# Patient Record
Sex: FEMALE | Race: WHITE | Employment: UNEMPLOYED | URBAN - METROPOLITAN AREA
[De-identification: names, ages, dates, MRNs, and addresses within clinical notes are randomized per-mention and may not be internally consistent; named-entity substitution may affect disease eponyms.]

---

## 2018-01-29 ENCOUNTER — APPOINTMENT (OUTPATIENT)
Dept: GENERAL RADIOLOGY | Age: 38
DRG: 181 | End: 2018-01-29
Payer: COMMERCIAL

## 2018-01-29 ENCOUNTER — HOSPITAL ENCOUNTER (INPATIENT)
Age: 38
LOS: 11 days | Discharge: HOME HEALTH | DRG: 181 | End: 2018-02-09
Attending: EMERGENCY MEDICINE | Admitting: PODIATRIST
Payer: COMMERCIAL

## 2018-01-29 DIAGNOSIS — Z95.828 STATUS POST BYPASS GRAFT OF EXTREMITY: ICD-10-CM

## 2018-01-29 DIAGNOSIS — G89.18 POST-OP PAIN: ICD-10-CM

## 2018-01-29 DIAGNOSIS — L03.116 CELLULITIS OF LEFT FOOT: ICD-10-CM

## 2018-01-29 DIAGNOSIS — L03.116 CELLULITIS OF LEFT LOWER EXTREMITY: ICD-10-CM

## 2018-01-29 DIAGNOSIS — I96 GANGRENE OF TOE OF LEFT FOOT (HCC): ICD-10-CM

## 2018-01-29 DIAGNOSIS — M86.9 OSTEOMYELITIS OF LEFT FOOT, UNSPECIFIED TYPE (HCC): Primary | ICD-10-CM

## 2018-01-29 PROBLEM — L97.529 CHRONIC ULCER OF GREAT TOE OF LEFT FOOT (HCC): Status: ACTIVE | Noted: 2018-01-29

## 2018-01-29 LAB
ABSOLUTE EOS #: 0.49 K/UL (ref 0–0.44)
ABSOLUTE IMMATURE GRANULOCYTE: 0.03 K/UL (ref 0–0.3)
ABSOLUTE LYMPH #: 2.87 K/UL (ref 1.1–3.7)
ABSOLUTE MONO #: 0.65 K/UL (ref 0.1–1.2)
ANION GAP SERPL CALCULATED.3IONS-SCNC: 12 MMOL/L (ref 9–17)
BASOPHILS # BLD: 0 % (ref 0–2)
BASOPHILS ABSOLUTE: 0.03 K/UL (ref 0–0.2)
BUN BLDV-MCNC: 12 MG/DL (ref 6–20)
BUN/CREAT BLD: ABNORMAL (ref 9–20)
C-REACTIVE PROTEIN: 40.9 MG/L (ref 0–5)
CALCIUM SERPL-MCNC: 9.1 MG/DL (ref 8.6–10.4)
CHLORIDE BLD-SCNC: 102 MMOL/L (ref 98–107)
CO2: 30 MMOL/L (ref 20–31)
CREAT SERPL-MCNC: 0.46 MG/DL (ref 0.5–0.9)
DIFFERENTIAL TYPE: ABNORMAL
EOSINOPHILS RELATIVE PERCENT: 5 % (ref 1–4)
GFR AFRICAN AMERICAN: >60 ML/MIN
GFR NON-AFRICAN AMERICAN: >60 ML/MIN
GFR SERPL CREATININE-BSD FRML MDRD: ABNORMAL ML/MIN/{1.73_M2}
GFR SERPL CREATININE-BSD FRML MDRD: ABNORMAL ML/MIN/{1.73_M2}
GLUCOSE BLD-MCNC: 110 MG/DL (ref 70–99)
HCT VFR BLD CALC: 40.5 % (ref 36.3–47.1)
HEMOGLOBIN: 13.3 G/DL (ref 11.9–15.1)
IMMATURE GRANULOCYTES: 0 %
LYMPHOCYTES # BLD: 29 % (ref 24–43)
MCH RBC QN AUTO: 31.7 PG (ref 25.2–33.5)
MCHC RBC AUTO-ENTMCNC: 32.8 G/DL (ref 28.4–34.8)
MCV RBC AUTO: 96.7 FL (ref 82.6–102.9)
MONOCYTES # BLD: 7 % (ref 3–12)
NRBC AUTOMATED: 0 PER 100 WBC
PDW BLD-RTO: 13.5 % (ref 11.8–14.4)
PLATELET # BLD: 329 K/UL (ref 138–453)
PLATELET ESTIMATE: ABNORMAL
PMV BLD AUTO: 9.1 FL (ref 8.1–13.5)
POTASSIUM SERPL-SCNC: 3.8 MMOL/L (ref 3.7–5.3)
RBC # BLD: 4.19 M/UL (ref 3.95–5.11)
RBC # BLD: ABNORMAL 10*6/UL
SEDIMENTATION RATE, ERYTHROCYTE: 21 MM (ref 0–20)
SEG NEUTROPHILS: 59 % (ref 36–65)
SEGMENTED NEUTROPHILS ABSOLUTE COUNT: 5.82 K/UL (ref 1.5–8.1)
SODIUM BLD-SCNC: 144 MMOL/L (ref 135–144)
WBC # BLD: 9.9 K/UL (ref 3.5–11.3)
WBC # BLD: ABNORMAL 10*3/UL

## 2018-01-29 PROCEDURE — 86140 C-REACTIVE PROTEIN: CPT

## 2018-01-29 PROCEDURE — 85025 COMPLETE CBC W/AUTO DIFF WBC: CPT

## 2018-01-29 PROCEDURE — 96375 TX/PRO/DX INJ NEW DRUG ADDON: CPT

## 2018-01-29 PROCEDURE — 93970 EXTREMITY STUDY: CPT

## 2018-01-29 PROCEDURE — 6360000002 HC RX W HCPCS: Performed by: EMERGENCY MEDICINE

## 2018-01-29 PROCEDURE — 96365 THER/PROPH/DIAG IV INF INIT: CPT

## 2018-01-29 PROCEDURE — 85651 RBC SED RATE NONAUTOMATED: CPT

## 2018-01-29 PROCEDURE — 80048 BASIC METABOLIC PNL TOTAL CA: CPT

## 2018-01-29 PROCEDURE — 6360000002 HC RX W HCPCS: Performed by: STUDENT IN AN ORGANIZED HEALTH CARE EDUCATION/TRAINING PROGRAM

## 2018-01-29 PROCEDURE — G0384 LEV 5 HOSP TYPE B ED VISIT: HCPCS

## 2018-01-29 PROCEDURE — 1200000000 HC SEMI PRIVATE

## 2018-01-29 PROCEDURE — 6370000000 HC RX 637 (ALT 250 FOR IP): Performed by: STUDENT IN AN ORGANIZED HEALTH CARE EDUCATION/TRAINING PROGRAM

## 2018-01-29 PROCEDURE — 73630 X-RAY EXAM OF FOOT: CPT

## 2018-01-29 PROCEDURE — 84703 CHORIONIC GONADOTROPIN ASSAY: CPT

## 2018-01-29 PROCEDURE — 6370000000 HC RX 637 (ALT 250 FOR IP): Performed by: EMERGENCY MEDICINE

## 2018-01-29 PROCEDURE — 2580000003 HC RX 258: Performed by: STUDENT IN AN ORGANIZED HEALTH CARE EDUCATION/TRAINING PROGRAM

## 2018-01-29 PROCEDURE — 93971 EXTREMITY STUDY: CPT

## 2018-01-29 RX ORDER — OXYCODONE HYDROCHLORIDE 5 MG/1
5 TABLET ORAL ONCE
Status: COMPLETED | OUTPATIENT
Start: 2018-01-29 | End: 2018-01-29

## 2018-01-29 RX ORDER — LEVOFLOXACIN 5 MG/ML
500 INJECTION, SOLUTION INTRAVENOUS EVERY 24 HOURS
Status: DISCONTINUED | OUTPATIENT
Start: 2018-01-29 | End: 2018-01-30

## 2018-01-29 RX ORDER — VANCOMYCIN HYDROCHLORIDE 1 G/200ML
1000 INJECTION, SOLUTION INTRAVENOUS ONCE
Status: COMPLETED | OUTPATIENT
Start: 2018-01-29 | End: 2018-01-29

## 2018-01-29 RX ORDER — ONDANSETRON 2 MG/ML
4 INJECTION INTRAMUSCULAR; INTRAVENOUS EVERY 6 HOURS PRN
Status: DISCONTINUED | OUTPATIENT
Start: 2018-01-29 | End: 2018-02-09 | Stop reason: HOSPADM

## 2018-01-29 RX ORDER — GABAPENTIN 300 MG/1
300 CAPSULE ORAL 3 TIMES DAILY
Status: DISCONTINUED | OUTPATIENT
Start: 2018-01-29 | End: 2018-02-04

## 2018-01-29 RX ORDER — SODIUM CHLORIDE 0.9 % (FLUSH) 0.9 %
10 SYRINGE (ML) INJECTION PRN
Status: DISCONTINUED | OUTPATIENT
Start: 2018-01-29 | End: 2018-02-09 | Stop reason: HOSPADM

## 2018-01-29 RX ORDER — KETOROLAC TROMETHAMINE 30 MG/ML
30 INJECTION, SOLUTION INTRAMUSCULAR; INTRAVENOUS ONCE
Status: COMPLETED | OUTPATIENT
Start: 2018-01-29 | End: 2018-01-29

## 2018-01-29 RX ORDER — SODIUM CHLORIDE 0.9 % (FLUSH) 0.9 %
10 SYRINGE (ML) INJECTION EVERY 12 HOURS SCHEDULED
Status: DISCONTINUED | OUTPATIENT
Start: 2018-01-29 | End: 2018-02-09 | Stop reason: HOSPADM

## 2018-01-29 RX ORDER — CLINDAMYCIN HYDROCHLORIDE 300 MG/1
300 CAPSULE ORAL 3 TIMES DAILY
Status: ON HOLD | COMMUNITY
End: 2018-02-04 | Stop reason: HOSPADM

## 2018-01-29 RX ORDER — CLOBETASOL PROPIONATE 0.5 MG/G
OINTMENT TOPICAL 2 TIMES DAILY
Status: DISCONTINUED | OUTPATIENT
Start: 2018-01-29 | End: 2018-02-04

## 2018-01-29 RX ORDER — MORPHINE SULFATE 2 MG/ML
2 INJECTION, SOLUTION INTRAMUSCULAR; INTRAVENOUS
Status: DISCONTINUED | OUTPATIENT
Start: 2018-01-29 | End: 2018-01-31

## 2018-01-29 RX ORDER — OXYCODONE HYDROCHLORIDE AND ACETAMINOPHEN 5; 325 MG/1; MG/1
1 TABLET ORAL EVERY 4 HOURS PRN
Status: DISCONTINUED | OUTPATIENT
Start: 2018-01-29 | End: 2018-02-02

## 2018-01-29 RX ORDER — MORPHINE SULFATE 2 MG/ML
4 INJECTION, SOLUTION INTRAMUSCULAR; INTRAVENOUS
Status: DISCONTINUED | OUTPATIENT
Start: 2018-01-29 | End: 2018-01-31

## 2018-01-29 RX ORDER — ACETAMINOPHEN 325 MG/1
650 TABLET ORAL EVERY 4 HOURS PRN
Status: DISCONTINUED | OUTPATIENT
Start: 2018-01-29 | End: 2018-01-30

## 2018-01-29 RX ORDER — CIPROFLOXACIN 500 MG/1
500 TABLET, FILM COATED ORAL 2 TIMES DAILY
Status: ON HOLD | COMMUNITY
End: 2018-02-04 | Stop reason: HOSPADM

## 2018-01-29 RX ORDER — IBUPROFEN 800 MG/1
800 TABLET ORAL EVERY 6 HOURS PRN
COMMUNITY
End: 2018-02-26 | Stop reason: ALTCHOICE

## 2018-01-29 RX ORDER — GABAPENTIN 300 MG/1
600 CAPSULE ORAL 3 TIMES DAILY
COMMUNITY

## 2018-01-29 RX ORDER — SERTRALINE HYDROCHLORIDE 25 MG/1
25 TABLET, FILM COATED ORAL NIGHTLY
Status: DISCONTINUED | OUTPATIENT
Start: 2018-01-29 | End: 2018-02-09

## 2018-01-29 RX ADMIN — OXYCODONE HYDROCHLORIDE AND ACETAMINOPHEN 1 TABLET: 5; 325 TABLET ORAL at 23:19

## 2018-01-29 RX ADMIN — HYDROMORPHONE HYDROCHLORIDE 1 MG: 1 INJECTION, SOLUTION INTRAMUSCULAR; INTRAVENOUS; SUBCUTANEOUS at 18:01

## 2018-01-29 RX ADMIN — KETOROLAC TROMETHAMINE 30 MG: 30 INJECTION, SOLUTION INTRAMUSCULAR at 14:02

## 2018-01-29 RX ADMIN — ENOXAPARIN SODIUM 40 MG: 40 INJECTION SUBCUTANEOUS at 22:11

## 2018-01-29 RX ADMIN — GABAPENTIN 300 MG: 300 CAPSULE ORAL at 22:11

## 2018-01-29 RX ADMIN — LEVOFLOXACIN 500 MG: 5 INJECTION, SOLUTION INTRAVENOUS at 22:11

## 2018-01-29 RX ADMIN — OXYCODONE HYDROCHLORIDE 5 MG: 5 TABLET ORAL at 17:29

## 2018-01-29 RX ADMIN — SERTRALINE 25 MG: 25 TABLET, FILM COATED ORAL at 22:11

## 2018-01-29 RX ADMIN — CLOBETASOL PROPIONATE: 0.5 OINTMENT TOPICAL at 22:08

## 2018-01-29 RX ADMIN — Medication 10 ML: at 22:04

## 2018-01-29 RX ADMIN — MORPHINE SULFATE 2 MG: 2 INJECTION, SOLUTION INTRAMUSCULAR; INTRAVENOUS at 22:04

## 2018-01-29 RX ADMIN — VANCOMYCIN HYDROCHLORIDE 1000 MG: 1 INJECTION, SOLUTION INTRAVENOUS at 15:30

## 2018-01-29 ASSESSMENT — PAIN SCALES - GENERAL
PAINLEVEL_OUTOF10: 5
PAINLEVEL_OUTOF10: 10
PAINLEVEL_OUTOF10: 8
PAINLEVEL_OUTOF10: 10
PAINLEVEL_OUTOF10: 8

## 2018-01-29 ASSESSMENT — ENCOUNTER SYMPTOMS
BACK PAIN: 0
VOMITING: 0
NAUSEA: 0
COUGH: 0

## 2018-01-29 ASSESSMENT — PAIN DESCRIPTION - FREQUENCY
FREQUENCY: CONTINUOUS
FREQUENCY: CONTINUOUS

## 2018-01-29 ASSESSMENT — PAIN DESCRIPTION - DESCRIPTORS
DESCRIPTORS: ACHING;BURNING;TENDER;THROBBING
DESCRIPTORS: CONSTANT

## 2018-01-29 ASSESSMENT — PAIN DESCRIPTION - ORIENTATION: ORIENTATION: LEFT

## 2018-01-29 ASSESSMENT — PAIN DESCRIPTION - LOCATION
LOCATION: FOOT
LOCATION: FOOT

## 2018-01-29 ASSESSMENT — PAIN DESCRIPTION - PAIN TYPE
TYPE: ACUTE PAIN
TYPE: CHRONIC PAIN;ACUTE PAIN

## 2018-01-29 NOTE — ED PROVIDER NOTES
[address]  ED  Emergency Department Encounter  Emergency Medicine Resident     Pt Name: Ever Laboy  MRN: [id number]  Rcgfjulia 1980  Date of evaluation: 18  PCP:  Daniel Bullard, [facility]       Chief Complaint   Patient presents with    Foot Pain     pt states that she was sent here by dr Jon Lamas for pain control. she said that her insurance told her to go to pain management and she said dr Jon Lamas said he can't give her anymore pain meds and told her to come to the er.  pt is tearful and said she is tired of being in pain. pt just ended course of roxycodone and norco.pt currently taking neurontin and 2 antibiotics. HISTORY OF PRESENT ILLNESS  (Location/Symptom, Timing/Onset, Context/Setting, Quality, Duration, Modifying Factors, Severity.)      Ever Laboy is a 30 y.o. female who presents with  Chief complaint of left foot pain, discoloration. States she had a small piece of wood that was removed from underneath the toenail on the great toe on the left for approximately one year ago with a local podiatrist. Says she's had trouble with this foot healing. She says she went to his office today and was asked to come to the emergency department. She was seen at an outside emergency department 4 days ago where she was started on antibiotics. She tells me she has \"blood clots\" in this foot, but does not take any anticoagulant, antiplatelet. No fevers sweats chills nausea vomiting. No chest pain or shortness of breath no back pain or abdominal discomfort. PAST MEDICAL / SURGICAL / SOCIAL / FAMILY HISTORY      has a past medical history of Chronic back pain; Psoriasis; and Scoliosis. has a past surgical history that includes  section. Social History     Social History    Marital status: Single     Spouse name: N/A    Number of children: N/A    Years of education: N/A     Occupational History    Not on file.      Social History Main Topics    Smoking status: Current Every Day Smoker     Packs/day: 1.00     Years: 15.00    Smokeless tobacco: Never Used    Alcohol use No    Drug use: Yes     Types: Marijuana    Sexual activity: Yes     Partners: Male     Other Topics Concern    Not on file     Social History Narrative    No narrative on file       Family History   Problem Relation Age of Onset    Cancer Mother     Diabetes Maternal Grandmother     Stroke Maternal Grandmother        Allergies:  Review of patient's allergies indicates no known allergies. Home Medications:  Prior to Admission medications    Medication Sig Start Date End Date Taking? Authorizing Provider   gabapentin (NEURONTIN) 300 MG capsule Take 300 mg by mouth 3 times daily. Yes Historical Provider, MD   ibuprofen (ADVIL;MOTRIN) 800 MG tablet Take 800 mg by mouth every 6 hours as needed for Pain   Yes Historical Provider, MD   clindamycin (CLEOCIN) 300 MG capsule Take 300 mg by mouth 3 times daily   Yes Historical Provider, MD   ciprofloxacin (CIPRO) 500 MG tablet Take 500 mg by mouth 2 times daily   Yes Historical Provider, MD   clobetasol (TEMOVATE) 0.05 % ointment Apply topically 2 times daily. 3/6/14   Nikita Heredia MD   sertraline (ZOLOFT) 25 MG tablet Take 1 tablet by mouth daily. 3/6/14   Nikita Heredia MD       REVIEW OF SYSTEMS    (2-9 systems for level 4, 10 or more for level 5)      Review of Systems   Constitutional: Negative for chills and fever. HENT: Negative for congestion. Respiratory: Negative for cough. Cardiovascular: Negative for chest pain. Gastrointestinal: Negative for nausea and vomiting. Musculoskeletal: Positive for gait problem and myalgias. Negative for back pain. Skin: Positive for wound. Allergic/Immunologic: Negative for immunocompromised state. Neurological: Negative for weakness. Hematological: Does not bruise/bleed easily.        PHYSICAL EXAM   (up to 7 for level 4, 8 or more for level 5)      INITIAL VITALS:   /74   Pulse 84   Temp 98.1 °F (36.7 °C) (Oral)   Resp 14   SpO2 98%     Physical Exam   Constitutional: She is oriented to person, place, and time. She appears well-developed and well-nourished. HENT:   Head: Normocephalic and atraumatic. Nose: Nose normal.   Eyes: Conjunctivae and EOM are normal. Right eye exhibits no discharge. Left eye exhibits no discharge. No scleral icterus. Neck: Normal range of motion. No JVD present. No tracheal deviation present. Cardiovascular: Normal rate, regular rhythm and normal heart sounds. Pulmonary/Chest: Effort normal and breath sounds normal. No respiratory distress. She has no wheezes. She has no rales. Abdominal: Soft. Bowel sounds are normal. She exhibits no distension. There is no tenderness. There is no rebound. Musculoskeletal: She exhibits tenderness. She exhibits no edema or deformity. Neurological: She is alert and oriented to person, place, and time. She exhibits normal muscle tone. Coordination normal.   Skin: Skin is warm and dry. She is not diaphoretic. There is erythema. Psychiatric: She has a normal mood and affect.  Her behavior is normal.       DIFFERENTIAL  DIAGNOSIS     PLAN (LABS / IMAGING / EKG):  Orders Placed This Encounter   Procedures    XR FOOT LEFT (MIN 3 VIEWS)    VL DUP LOWER EXTREMITY VENOUS LEFT    CBC WITH AUTO DIFFERENTIAL    Basic Metabolic Panel    C-REACTIVE PROTEIN    Sedimentation Rate    Inpatient consult to Podiatry    Insert peripheral IV       MEDICATIONS ORDERED:  Orders Placed This Encounter   Medications    ketorolac (TORADOL) injection 30 mg    vancomycin (VANCOCIN) 1000 mg in dextrose 5% 200 mL IVPB         DIAGNOSTIC RESULTS / EMERGENCY DEPARTMENT COURSE / MDM     LABS:  Results for orders placed or performed during the hospital encounter of 01/29/18   CBC WITH AUTO DIFFERENTIAL   Result Value Ref Range    WBC 9.9 3.5 - 11.3 k/uL    RBC 4.19 3.95 - 5.11 m/uL    Hemoglobin 13.3 11.9 -

## 2018-01-29 NOTE — H&P
hallux  -psoriatic arthritis   -PVD  -neuropathy    Patient Active Problem List   Diagnosis    Gangrene of toe of left foot ([facility] [address]       PLAN:     Patient examined and evaluated. Diagnosis and treatment options discussed in detail.   Reviewed XR  Admit patient   Resume home medications  Betadine to left hallux, DSD   PVR/ PPG  Vascular consult- gangrenous toe  ID consult- antibiotic recommendations   Diet- General  Activity- WBAT with surgical shoe to left  DVT prophylaxis- Lovenox   PT/OT- eval  - discharge planning   Discussed with Dr. Clover Tello        Electronically signed by Lopez Shirley DPM  on 1/29/2018 at 4:40 PM

## 2018-01-29 NOTE — ED NOTES
Pt returned from scan. Pt resting on stretcher. C/o pain. Dr Tabby Mcguire at bedside. Updated pt that we are starting an antibiotic.       Radha Lynn RN  01/29/18 [id number]

## 2018-01-30 LAB
ABSOLUTE EOS #: 0.58 K/UL (ref 0–0.4)
ABSOLUTE IMMATURE GRANULOCYTE: 0 K/UL (ref 0–0.3)
ABSOLUTE LYMPH #: 3.94 K/UL (ref 1–4.8)
ABSOLUTE MONO #: 0.29 K/UL (ref 0.1–0.8)
ANION GAP SERPL CALCULATED.3IONS-SCNC: 12 MMOL/L (ref 9–17)
BASOPHILS # BLD: 1 % (ref 0–2)
BASOPHILS ABSOLUTE: 0.1 K/UL (ref 0–0.2)
BUN BLDV-MCNC: 12 MG/DL (ref 6–20)
BUN/CREAT BLD: ABNORMAL (ref 9–20)
CALCIUM SERPL-MCNC: 8.8 MG/DL (ref 8.6–10.4)
CHLORIDE BLD-SCNC: 101 MMOL/L (ref 98–107)
CO2: 25 MMOL/L (ref 20–31)
CREAT SERPL-MCNC: 0.41 MG/DL (ref 0.5–0.9)
DIFFERENTIAL TYPE: ABNORMAL
EOSINOPHILS RELATIVE PERCENT: 6 % (ref 1–4)
GFR AFRICAN AMERICAN: >60 ML/MIN
GFR NON-AFRICAN AMERICAN: >60 ML/MIN
GFR SERPL CREATININE-BSD FRML MDRD: ABNORMAL ML/MIN/{1.73_M2}
GFR SERPL CREATININE-BSD FRML MDRD: ABNORMAL ML/MIN/{1.73_M2}
GLUCOSE BLD-MCNC: 93 MG/DL (ref 70–99)
HCT VFR BLD CALC: 40.2 % (ref 36.3–47.1)
HEMOGLOBIN: 12.9 G/DL (ref 11.9–15.1)
IMMATURE GRANULOCYTES: 0 %
LYMPHOCYTES # BLD: 41 % (ref 24–44)
MCH RBC QN AUTO: 31.4 PG (ref 25.2–33.5)
MCHC RBC AUTO-ENTMCNC: 32.1 G/DL (ref 28.4–34.8)
MCV RBC AUTO: 97.8 FL (ref 82.6–102.9)
MONOCYTES # BLD: 3 % (ref 1–7)
MORPHOLOGY: NORMAL
NRBC AUTOMATED: 0 PER 100 WBC
PDW BLD-RTO: 13.5 % (ref 11.8–14.4)
PLATELET # BLD: 368 K/UL (ref 138–453)
PLATELET ESTIMATE: ABNORMAL
PMV BLD AUTO: 9.7 FL (ref 8.1–13.5)
POTASSIUM SERPL-SCNC: 3.9 MMOL/L (ref 3.7–5.3)
RBC # BLD: 4.11 M/UL (ref 3.95–5.11)
RBC # BLD: ABNORMAL 10*6/UL
SEG NEUTROPHILS: 49 % (ref 36–66)
SEGMENTED NEUTROPHILS ABSOLUTE COUNT: 4.69 K/UL (ref 1.8–7.7)
SODIUM BLD-SCNC: 138 MMOL/L (ref 135–144)
WBC # BLD: 9.6 K/UL (ref 3.5–11.3)
WBC # BLD: ABNORMAL 10*3/UL

## 2018-01-30 PROCEDURE — G8978 MOBILITY CURRENT STATUS: HCPCS

## 2018-01-30 PROCEDURE — G8979 MOBILITY GOAL STATUS: HCPCS

## 2018-01-30 PROCEDURE — 36415 COLL VENOUS BLD VENIPUNCTURE: CPT

## 2018-01-30 PROCEDURE — 97530 THERAPEUTIC ACTIVITIES: CPT

## 2018-01-30 PROCEDURE — 6360000002 HC RX W HCPCS

## 2018-01-30 PROCEDURE — 6370000000 HC RX 637 (ALT 250 FOR IP): Performed by: STUDENT IN AN ORGANIZED HEALTH CARE EDUCATION/TRAINING PROGRAM

## 2018-01-30 PROCEDURE — 97161 PT EVAL LOW COMPLEX 20 MIN: CPT

## 2018-01-30 PROCEDURE — 85025 COMPLETE CBC W/AUTO DIFF WBC: CPT

## 2018-01-30 PROCEDURE — 6360000002 HC RX W HCPCS: Performed by: STUDENT IN AN ORGANIZED HEALTH CARE EDUCATION/TRAINING PROGRAM

## 2018-01-30 PROCEDURE — 93923 UPR/LXTR ART STDY 3+ LVLS: CPT

## 2018-01-30 PROCEDURE — G8980 MOBILITY D/C STATUS: HCPCS

## 2018-01-30 PROCEDURE — 99406 BEHAV CHNG SMOKING 3-10 MIN: CPT

## 2018-01-30 PROCEDURE — 2580000003 HC RX 258: Performed by: INTERNAL MEDICINE

## 2018-01-30 PROCEDURE — 99254 IP/OBS CNSLTJ NEW/EST MOD 60: CPT | Performed by: INTERNAL MEDICINE

## 2018-01-30 PROCEDURE — 80048 BASIC METABOLIC PNL TOTAL CA: CPT

## 2018-01-30 PROCEDURE — 1200000000 HC SEMI PRIVATE

## 2018-01-30 PROCEDURE — 6360000002 HC RX W HCPCS: Performed by: INTERNAL MEDICINE

## 2018-01-30 PROCEDURE — 2580000003 HC RX 258: Performed by: STUDENT IN AN ORGANIZED HEALTH CARE EDUCATION/TRAINING PROGRAM

## 2018-01-30 RX ORDER — BUTALBITAL, ACETAMINOPHEN AND CAFFEINE 50; 325; 40 MG/1; MG/1; MG/1
1 TABLET ORAL EVERY 4 HOURS PRN
Status: DISCONTINUED | OUTPATIENT
Start: 2018-01-30 | End: 2018-02-09 | Stop reason: HOSPADM

## 2018-01-30 RX ORDER — VANCOMYCIN HYDROCHLORIDE 1 G/200ML
1000 INJECTION, SOLUTION INTRAVENOUS EVERY 24 HOURS
Status: DISCONTINUED | OUTPATIENT
Start: 2018-01-31 | End: 2018-01-30

## 2018-01-30 RX ADMIN — OXYCODONE HYDROCHLORIDE AND ACETAMINOPHEN 1 TABLET: 5; 325 TABLET ORAL at 21:00

## 2018-01-30 RX ADMIN — GABAPENTIN 300 MG: 300 CAPSULE ORAL at 20:47

## 2018-01-30 RX ADMIN — SERTRALINE 25 MG: 25 TABLET, FILM COATED ORAL at 20:47

## 2018-01-30 RX ADMIN — MORPHINE SULFATE 2 MG: 2 INJECTION, SOLUTION INTRAMUSCULAR; INTRAVENOUS at 11:02

## 2018-01-30 RX ADMIN — Medication 10 ML: at 20:48

## 2018-01-30 RX ADMIN — OXYCODONE HYDROCHLORIDE AND ACETAMINOPHEN 1 TABLET: 5; 325 TABLET ORAL at 16:37

## 2018-01-30 RX ADMIN — ENOXAPARIN SODIUM 40 MG: 40 INJECTION SUBCUTANEOUS at 20:48

## 2018-01-30 RX ADMIN — MORPHINE SULFATE 4 MG: 2 INJECTION, SOLUTION INTRAMUSCULAR; INTRAVENOUS at 00:19

## 2018-01-30 RX ADMIN — ONDANSETRON 4 MG: 2 INJECTION INTRAMUSCULAR; INTRAVENOUS at 07:12

## 2018-01-30 RX ADMIN — GABAPENTIN 300 MG: 300 CAPSULE ORAL at 16:38

## 2018-01-30 RX ADMIN — GABAPENTIN 300 MG: 300 CAPSULE ORAL at 08:31

## 2018-01-30 RX ADMIN — ACETAMINOPHEN 650 MG: 325 TABLET ORAL at 08:29

## 2018-01-30 RX ADMIN — MORPHINE SULFATE 2 MG: 2 INJECTION, SOLUTION INTRAMUSCULAR; INTRAVENOUS at 11:21

## 2018-01-30 RX ADMIN — VANCOMYCIN HYDROCHLORIDE 750 MG: 1 INJECTION, POWDER, LYOPHILIZED, FOR SOLUTION INTRAVENOUS at 23:01

## 2018-01-30 RX ADMIN — BUTALBITAL, ACETAMINOPHEN, AND CAFFEINE 1 TABLET: 50; 325; 40 TABLET ORAL at 13:37

## 2018-01-30 RX ADMIN — CLOBETASOL PROPIONATE: 0.5 OINTMENT TOPICAL at 20:47

## 2018-01-30 RX ADMIN — BUTALBITAL, ACETAMINOPHEN, AND CAFFEINE 1 TABLET: 50; 325; 40 TABLET ORAL at 20:47

## 2018-01-30 RX ADMIN — OXYCODONE HYDROCHLORIDE AND ACETAMINOPHEN 1 TABLET: 5; 325 TABLET ORAL at 04:52

## 2018-01-30 ASSESSMENT — PAIN DESCRIPTION - FREQUENCY: FREQUENCY: CONTINUOUS

## 2018-01-30 ASSESSMENT — PAIN SCALES - GENERAL
PAINLEVEL_OUTOF10: 6
PAINLEVEL_OUTOF10: 8
PAINLEVEL_OUTOF10: 10
PAINLEVEL_OUTOF10: 6
PAINLEVEL_OUTOF10: 10

## 2018-01-30 ASSESSMENT — PAIN DESCRIPTION - PAIN TYPE
TYPE: ACUTE PAIN
TYPE_2: ACUTE PAIN
TYPE: ACUTE PAIN

## 2018-01-30 ASSESSMENT — PAIN DESCRIPTION - DESCRIPTORS
DESCRIPTORS: HEADACHE
DESCRIPTORS: HEADACHE

## 2018-01-30 ASSESSMENT — ENCOUNTER SYMPTOMS
ALLERGIC/IMMUNOLOGIC NEGATIVE: 1
EYES NEGATIVE: 1
COLOR CHANGE: 1
SHORTNESS OF BREATH: 0

## 2018-01-30 ASSESSMENT — PAIN DESCRIPTION - LOCATION
LOCATION: HEAD
LOCATION_2: FOOT
LOCATION: HEAD

## 2018-01-30 ASSESSMENT — PAIN DESCRIPTION - DURATION: DURATION_2: CONTINUOUS

## 2018-01-30 ASSESSMENT — PAIN DESCRIPTION - ONSET
ONSET: ON-GOING
ONSET_2: ON-GOING

## 2018-01-30 ASSESSMENT — PAIN DESCRIPTION - INTENSITY: RATING_2: 4

## 2018-01-30 ASSESSMENT — PAIN DESCRIPTION - ORIENTATION: ORIENTATION_2: LEFT

## 2018-01-30 NOTE — PROGRESS NOTES
results found for: PROTIME, INR    Calcium:    Lab Results   Component Value Date    CALCIUM 9.1 01/29/2018     Ionized Calcium:  No results found for: IONCA  Magnesium:  No results found for: MG  Phosphorus:  No results found for: PHOS  Albumin:  No results found for: LABALBU    L foot XR 1/29/18  FINDINGS:   Normal alignment of the bones.  No acute fracture.       There is a subtle cortical lucency and irregularity involving the tip of the   distal phalanx great toe with surrounding soft tissue swelling.  Cellulitis   and osteomyelitis not excluded.  Consider further imaging with MRI. General: AOx3, NAD  Heart: RRR   Lungs: Symmetric rise and fall of chest with inspiration, non-labored  Abdomen: Soft, nontender, nondistended  Extremity: Right DP/PT palpable. Left DP/PT non-palpable. Left AT/PT audible on doppler. Left DP not audible on doppler. Left popliteal triphasic on doppler. Left hallux toenail absent with overlying hyperkeratotic/necrotic tissue, tender to palpation. No drainage or malodor noted. Dorsum of left foot +edema/erythema extending to inferior ankle. No crepitus or induration noted. Multiple scaling plaques consistent noted to b/l LE.             ASSESSMENT   Active Problems:    Gangrene of toe of left foot ([address]    29 y/o female with chronic non-healing wound to L hallux. Healing complicated by psoriasis medications and h/o tobacco abuse. Wound with some areas of necrosis, cellulitis, and possible osteo based on X-ray. PLAN    1. F/u OPAL/PVR  2. Continue Vascular checks with vital  3. ID following for abx management  4.  Podiatry following for wound management      Electronically signed by Brisa Valle DPM on 1/30/2018 at 6:59 AM

## 2018-01-30 NOTE — FLOWSHEET NOTE
01/30/18 1144   Encounter Summary   Services provided to: Patient   Place of Anglican None   Contact Confucianism No   Continue Visiting (1/30/18)   Complexity of Encounter Low   Length of Encounter 30 minutes   Spiritual Assessment Completed Yes   Routine   Type Initial     I visited the patient on rounds. She was sitting up in her bed and was awake and alert. She showed me her foot and the painful infection that may lead to amputation. She was tearful when I entered the room and she confided that she is in pain: physical, emotional and spiritual. I listened as she told me her story. As I listened it became apparent that she does not have much support from her family and feels alone. She told me that her son and sister in law have visited her but not her . She also told me about her spiritual journey. She does not have a Anabaptist affiliation but told me that she has been studying Orthodoxy and listening to Bible recordings. She was receptive to my visit and was glad for me to pray with her. Chaplains are available for further spiritual and emotional support as needed.

## 2018-01-30 NOTE — PROGRESS NOTES
Physical Therapy    Facility/Department: [facility]  Initial Assessment    NAME: Kellee Chin  : 1980  MRN: [id number]    Date of Service: 2018  Chief Complaint   Patient presents with    Foot Pain     pt states that she was sent here by dr Franklin Fagan for pain control. she said that her insurance told her to go to pain management and she said dr Franklin Fagan said he can't give her anymore pain meds and told her to come to the er.  pt is tearful and said she is tired of being in pain. pt just ended course of roxycodone and norco.pt currently taking neurontin and 2 antibiotics. Patient Diagnosis(es): The primary encounter diagnosis was Osteomyelitis of left foot, unspecified type ([address] A diagnosis of Cellulitis of left lower extremity was also pertinent to this visit. has a past medical history of Chronic back pain; Psoriasis; and Scoliosis. has a past surgical history that includes  section. Restrictions  Restrictions/Precautions  Restrictions/Precautions: Weight Bearing  Lower Extremity Weight Bearing Restrictions  Left Lower Extremity Weight Bearing: Weight Bearing As Tolerated  Partial Weight Bearing Percentage Or Pounds: WBAT in surgical shoe  Position Activity Restriction  Other position/activity restrictions: L LE WBAT in surgical shoe  Vision/Hearing  Vision: Within Functional Limits  Hearing: Within functional limits     Subjective  General  Patient assessed for rehabilitation services?: Yes  Response To Previous Treatment: Not applicable  Family / Caregiver Present: No  Follows Commands: Within Functional Limits  Subjective  Subjective: Pt up in bathroom upon arrival this afternoon. Pt reports a headache today which is making her nauseated- states she wonders if it is from the medications as she doesn't usually take many medications.   Pain Screening  Patient Currently in Pain: Yes  Pain Assessment  Pain Assessment: 0-10  Pain Level: 6  Pain Type: Acute pain  Pain Location:

## 2018-01-30 NOTE — CARE COORDINATION
Case Management Initial Discharge Plan  Mlnykevleif Bhumika,         Readmission Risk              Readmission Risk:        0       Age 72 or Greater:  0    Admitted from SNF or Requires Paid or Family Care:      Currently has CHF,COPD,ARF,CRI,or is on dialysis:      Takes more than 5 Prescription Medications:      Takes Digoxin,Insulin,Anticoagulants,Narcotics or ASA/Plavix:      Hospital Admit in Past 12 Months:      On Disability:      Patient Considers own Health:              Met with:patient to discuss discharge plans.    Information verified: address, contacts, phone number, , insurance Yes  PCP: Janette Byers CNP  Date of last visit: 5 months ago    Insurance Provider: Teresa    Discharge Planning  Current Residence:  Private Residence  Living Arrangements:  Spouse/Significant Other, Children   Home has one story  Support Systems:  Spouse/Significant Other, Children, Family Members  Current Services PTA:  None   Patient able to perform ADL's:Independent  DME used to aid ambulation prior to admission: N/A    Potential Assistance Needed:  N/A    Pharmacy: [facility] Medications:  No  Does patient want to participate in local refill/ meds to beds program?  No    Patient agreeable to home care: No  [place] of choice provided:  n/a      Type of Home Care Services:  None  Patient expects to be discharged to:  home    Prior SNF/Rehab Placement and Facility: N/A  Agreeable to SNF/Rehab: No  [place] of choice provided: n/a   Evaluation: n/a    Expected Discharge date:  18  Follow Up Appointment: Best Day/ Time:      Transportation provider: chen  Transportation arrangements needed for discharge: No    Discharge Plan: home        Electronically signed by Dafne Gomez RN on 18 at 11:45 AM

## 2018-01-30 NOTE — PROGRESS NOTES
Podiatry Progress Note    HPI:     Pt seen and examined at bedside. Pt was tearful this morning and expresses she is stressed out with her health and her family situation at home. Complaining of pain to left foot and a headache. Denies N/F/V/C    Review of Systems  All systems reviewed as negative unless otherwise stated in HPI    PHYSICAL EXAM:     Vitals:   Vitals:    01/29/18 2000   BP:    Pulse:    Resp:    Temp: 98.1 °F (36.7 °C)   SpO2:      General: AAO x 3 in NAD. Lower Extremity Physical Exam:    Vascular: DP/PT pulses are weakly, Right. DP/PT pulses non-palpable, left; venous signal audible on doppler. CFT <3 seconds to all digits, campos. Edema and blanchable erythema to left hallux extending to midfoot    Neurological: Epicritic sensation intact to level of the digits, bilateral.    Musculoskeletal: Muscle strength 5/5 for all lower extremity muscle groups, Bilateral.  Pain present upon palpation of left hallux and midfoot. Dermatologic: Left foot noted to be cooler compared to right. Multiple psoriatic plaques noted diffusely to bilateral lower extremities. Left hallux nail is absent. Nail bed is covered in a hard dry crust with ischemic changes to distal tuft. Blanchable erythema extending from left distal digits to proximal midfoot.  No other open lesions of interdigital maceration noted campos                   Labs:  Lab Results   Component Value Date    WBC 9.6 01/30/2018    HGB 12.9 01/30/2018    HCT 40.2 01/30/2018     01/30/2018    LYMPHOPCT 41 01/30/2018    MONOPCT 3 01/30/2018    BASOPCT 1 01/30/2018     Lab Results   Component Value Date     01/30/2018    K 3.9 01/30/2018     01/30/2018    CO2 25 01/30/2018    BUN 12 01/30/2018    CREATININE 0.41 (L) 01/30/2018    GLUCOSE 93 01/30/2018    CALCIUM 8.8 01/30/2018     Imaging:   VL DUP LOWER EXTREMITY VENOUS LEFT   Final Result      XR FOOT LEFT (MIN 3 VIEWS)   Final Result   Suspect cellulitis and osteomyelitis of the distal phalanx left great toe. Consider further evaluation with MRI. VL LOWER EXTREMITY ARTERIAL SEGMENTAL PRESSURES W PPG    (Results Pending)   VL OPAL BILATERAL LIMITED 1-2 LEVELS    (Results Pending)   VL ARTERIAL PVR LOWER    (Results Pending)       ASSESSMENT:     Patient is a 30 y.o. female with left gangrenous hallux  -psoriatic arthritis   -PVD    Patient Active Problem List   Diagnosis    Gangrene of toe of left foot ([facility] [address]       PLAN:     Patient examined and evaluated. Diagnosis and treatment options discussed in detail.   Awaiting PVR/OPAL results  Betadine applied to Left hallux  Vascular on board - awaiting OPAL results  ID consult- antibiotic recommendations     Diet- General  Activity- WBAT with surgical shoe to left  DVT prophylaxis- Lovenox   PT/OT- eval  - discharge planning   Discussed with Dr. Nohemi Conner        Electronically signed by Wandy Najera DPM  on 1/30/2018 at 10:08 AM

## 2018-01-30 NOTE — CONSULTS
necrosis. We are consulted for management of osteomyelitis. Summary of relevant labs:  Labs:   WBC 9.6    Micro:    Imaging:  X-ray suggesting cellulitis and osteomyelitis of the distak phalanx of left great toe. Doppler venous:  No evidence of superficial or deep venous thrombosis in the right common   femoral vein or in the left lower extremity. Xray left foot:  Suspect cellulitis and osteomyelitis of the distal phalanx left great toe. Consider further evaluation with MRI. I have personally reviewed the past medical history, past surgical history, medications, social history, and family history, and I have updated the database accordingly. Past Medical History:     Past Medical History:   Diagnosis Date    Chronic back pain     Psoriasis     Scoliosis        Past Surgical  History:     Past Surgical History:   Procedure Laterality Date     SECTION         Medications:      clobetasol   Topical BID    gabapentin  300 mg Oral TID    sertraline  25 mg Oral Nightly    sodium chloride flush  10 mL Intravenous 2 times per day    enoxaparin  40 mg Subcutaneous Daily    levofloxacin  500 mg Intravenous Q24H       Social History:     Social History     Social History    Marital status: Single     Spouse name: N/A    Number of children: N/A    Years of education: N/A     Occupational History    Not on file.      Social History Main Topics    Smoking status: Current Every Day Smoker     Packs/day: 1.00     Years: 15.00    Smokeless tobacco: Never Used    Alcohol use No    Drug use: Yes     Types: Marijuana    Sexual activity: Yes     Partners: Male     Other Topics Concern    Not on file     Social History Narrative    No narrative on file       Family History:     Family History   Problem Relation Age of Onset    Cancer Mother     Diabetes Maternal Grandmother     Stroke Maternal Grandmother         Allergies:   Review of patient's allergies indicates no known

## 2018-01-31 ENCOUNTER — APPOINTMENT (OUTPATIENT)
Dept: CARDIAC CATH/INVASIVE PROCEDURES | Age: 38
DRG: 181 | End: 2018-01-31
Payer: COMMERCIAL

## 2018-01-31 LAB — HCG QUALITATIVE: NEGATIVE

## 2018-01-31 PROCEDURE — 99232 SBSQ HOSP IP/OBS MODERATE 35: CPT | Performed by: INTERNAL MEDICINE

## 2018-01-31 PROCEDURE — C1769 GUIDE WIRE: HCPCS

## 2018-01-31 PROCEDURE — 1200000000 HC SEMI PRIVATE

## 2018-01-31 PROCEDURE — 2580000003 HC RX 258: Performed by: STUDENT IN AN ORGANIZED HEALTH CARE EDUCATION/TRAINING PROGRAM

## 2018-01-31 PROCEDURE — 6360000002 HC RX W HCPCS: Performed by: STUDENT IN AN ORGANIZED HEALTH CARE EDUCATION/TRAINING PROGRAM

## 2018-01-31 PROCEDURE — 6360000004 HC RX CONTRAST MEDICATION

## 2018-01-31 PROCEDURE — C1894 INTRO/SHEATH, NON-LASER: HCPCS

## 2018-01-31 PROCEDURE — C1760 CLOSURE DEV, VASC: HCPCS

## 2018-01-31 PROCEDURE — 6360000002 HC RX W HCPCS: Performed by: INTERNAL MEDICINE

## 2018-01-31 PROCEDURE — 75774 ARTERY X-RAY EACH VESSEL: CPT

## 2018-01-31 PROCEDURE — 2580000003 HC RX 258: Performed by: INTERNAL MEDICINE

## 2018-01-31 PROCEDURE — 75716 ARTERY X-RAYS ARMS/LEGS: CPT

## 2018-01-31 PROCEDURE — 75625 CONTRAST EXAM ABDOMINL AORTA: CPT

## 2018-01-31 PROCEDURE — C1725 CATH, TRANSLUMIN NON-LASER: HCPCS

## 2018-01-31 PROCEDURE — 6370000000 HC RX 637 (ALT 250 FOR IP): Performed by: STUDENT IN AN ORGANIZED HEALTH CARE EDUCATION/TRAINING PROGRAM

## 2018-01-31 PROCEDURE — C1887 CATHETER, GUIDING: HCPCS

## 2018-01-31 PROCEDURE — 36247 INS CATH ABD/L-EXT ART 3RD: CPT

## 2018-01-31 PROCEDURE — B41D1ZZ FLUOROSCOPY OF AORTA AND BILATERAL LOWER EXTREMITY ARTERIES USING LOW OSMOLAR CONTRAST: ICD-10-PCS | Performed by: SURGERY

## 2018-01-31 PROCEDURE — C1874 STENT, COATED/COV W/DEL SYS: HCPCS

## 2018-01-31 RX ORDER — MORPHINE SULFATE 2 MG/ML
2 INJECTION, SOLUTION INTRAMUSCULAR; INTRAVENOUS
Status: DISCONTINUED | OUTPATIENT
Start: 2018-01-31 | End: 2018-02-05

## 2018-01-31 RX ORDER — SODIUM CHLORIDE 9 MG/ML
INJECTION, SOLUTION INTRAVENOUS CONTINUOUS
Status: DISCONTINUED | OUTPATIENT
Start: 2018-01-31 | End: 2018-02-02

## 2018-01-31 RX ORDER — MORPHINE SULFATE 2 MG/ML
INJECTION, SOLUTION INTRAMUSCULAR; INTRAVENOUS
Status: DISPENSED
Start: 2018-01-31 | End: 2018-01-31

## 2018-01-31 RX ORDER — MORPHINE SULFATE 2 MG/ML
4 INJECTION, SOLUTION INTRAMUSCULAR; INTRAVENOUS
Status: DISCONTINUED | OUTPATIENT
Start: 2018-01-31 | End: 2018-02-05

## 2018-01-31 RX ADMIN — MORPHINE SULFATE 4 MG: 2 INJECTION, SOLUTION INTRAMUSCULAR; INTRAVENOUS at 12:00

## 2018-01-31 RX ADMIN — GABAPENTIN 300 MG: 300 CAPSULE ORAL at 21:55

## 2018-01-31 RX ADMIN — SODIUM CHLORIDE: 9 INJECTION, SOLUTION INTRAVENOUS at 06:59

## 2018-01-31 RX ADMIN — Medication 10 ML: at 21:58

## 2018-01-31 RX ADMIN — ENOXAPARIN SODIUM 40 MG: 40 INJECTION SUBCUTANEOUS at 21:55

## 2018-01-31 RX ADMIN — VANCOMYCIN HYDROCHLORIDE 750 MG: 1 INJECTION, POWDER, LYOPHILIZED, FOR SOLUTION INTRAVENOUS at 11:00

## 2018-01-31 RX ADMIN — MORPHINE SULFATE 2 MG: 2 INJECTION, SOLUTION INTRAMUSCULAR; INTRAVENOUS at 09:02

## 2018-01-31 RX ADMIN — SERTRALINE 25 MG: 25 TABLET, FILM COATED ORAL at 21:55

## 2018-01-31 RX ADMIN — CLOBETASOL PROPIONATE: 0.5 OINTMENT TOPICAL at 09:31

## 2018-01-31 RX ADMIN — GABAPENTIN 300 MG: 300 CAPSULE ORAL at 09:31

## 2018-01-31 RX ADMIN — VANCOMYCIN HYDROCHLORIDE 750 MG: 1 INJECTION, POWDER, LYOPHILIZED, FOR SOLUTION INTRAVENOUS at 21:58

## 2018-01-31 RX ADMIN — OXYCODONE HYDROCHLORIDE AND ACETAMINOPHEN 1 TABLET: 5; 325 TABLET ORAL at 21:54

## 2018-01-31 RX ADMIN — MORPHINE SULFATE 2 MG: 2 INJECTION, SOLUTION INTRAMUSCULAR; INTRAVENOUS at 09:18

## 2018-01-31 RX ADMIN — SODIUM CHLORIDE: 9 INJECTION, SOLUTION INTRAVENOUS at 21:54

## 2018-01-31 RX ADMIN — ONDANSETRON 4 MG: 2 INJECTION INTRAMUSCULAR; INTRAVENOUS at 09:42

## 2018-01-31 RX ADMIN — OXYCODONE HYDROCHLORIDE AND ACETAMINOPHEN 1 TABLET: 5; 325 TABLET ORAL at 06:32

## 2018-01-31 RX ADMIN — GABAPENTIN 300 MG: 300 CAPSULE ORAL at 14:25

## 2018-01-31 RX ADMIN — MORPHINE SULFATE 4 MG: 2 INJECTION, SOLUTION INTRAMUSCULAR; INTRAVENOUS at 19:39

## 2018-01-31 ASSESSMENT — ENCOUNTER SYMPTOMS
EYES NEGATIVE: 1
GASTROINTESTINAL NEGATIVE: 1
ALLERGIC/IMMUNOLOGIC NEGATIVE: 1
RESPIRATORY NEGATIVE: 1

## 2018-01-31 ASSESSMENT — PAIN SCALES - GENERAL
PAINLEVEL_OUTOF10: 10
PAINLEVEL_OUTOF10: 9

## 2018-01-31 NOTE — PROGRESS NOTES
[facility] [address]  Occupational Therapy Not Seen Note    Patient not available for Occupational Therapy due to:    [] Testing:    [] Hemodialysis    [] Blood Transfusion in Progress    []Refusal by Patient:    [] Surgery/Procedure:    [] Strict Bedrest    [] Sedation    [] Spine Precautions     [] Pt being transferred to palliative care at this time. Spoke with pt/family and OT services to be defered. [] Pt independent with functional mobility and functional tasks.  Pt with no OT acute care needs at this time, will defer OT eval.    [x] Other: Cx per RN Yanci, pt in too much pain    Next Scheduled Treatment: Re-check as able 1/31/2018    Signature: PATTI Felipe/YULIA

## 2018-01-31 NOTE — PROGRESS NOTES
!Ratio       !Notch       !Amplitude       !   +---------------+---------------+------------+------------+----------------+   ! Calf           !108            !0.9         !            !                !   +---------------+---------------+------------+------------+----------------+   ! Ankle PT       !107            !0.89        !            !                !   +---------------+---------------+------------+------------+----------------+   ! Ankle DP       !121            !1.01        !            !                !   +---------------+---------------+------------+------------+----------------+         - Right Brachial Pressure:113.         - Right OPAL:1.01.       Left Plethysmographic Results   +---------------+---------------+------------+------------+----------------+   ! Left           !               !            !            !                !   +---------------+---------------+------------+------------+----------------+   ! Location       !Pressure       !Ratio       !Notch       !Amplitude       !   +---------------+---------------+------------+------------+----------------+   ! Calf           !109            !0.91        !            !                !   +---------------+---------------+------------+------------+----------------+   ! Ankle PT       !94             !0.78        !            !                !   +---------------+---------------+------------+------------+----------------+   ! Ankle DP       !37             !0.31        !            !                !   +---------------+---------------+------------+------------+----------------+         - Left Brachial Pressure:120.         - Left OPAL:0.78.       Plethysmographic Digit Evaluation   +---------++--------+-----+---------------++--------+-----+----------------+   !         ! ! Right   !     ! Left           !!        !     !                !   +---------++--------+-----+---------------++--------+-----+----------------+   ! Location ! !Pressure! Ratio! PPG Wave

## 2018-01-31 NOTE — PROGRESS NOTES
[facility]  Occupational Therapy Not Seen Note    Patient not available for Occupational Therapy due to:    [] Testing:    [] Hemodialysis    [] Blood Transfusion in Progress    []Refusal by Patient:    [x] Surgery/Procedure: diagnostic angiogram LLE with possible intervention today    [] Strict Bedrest    [] Sedation    [] Spine Precautions     [] Pt being transferred to palliative care at this time. Spoke with pt/family and OT services to be defered. [] Pt independent with functional mobility and functional tasks.  Pt with no OT acute care needs at this time, will defer OT eval.    [] Other    Next Scheduled Treatment: 2/1    Signature: Electronically signed by Sydnee Avery OT on 1/31/2018 at 3:37 PM

## 2018-01-31 NOTE — PROGRESS NOTES
 Smoking status: Current Every Day Smoker     Packs/day: 1.00     Years: 15.00    Smokeless tobacco: Never Used    Alcohol use No    Drug use: Yes     Types: Marijuana    Sexual activity: Yes     Partners: Male     Other Topics Concern    Not on file     Social History Narrative    No narrative on file       Family History:     Family History   Problem Relation Age of Onset    Cancer Mother     Diabetes Maternal Grandmother     Stroke Maternal Grandmother         Allergies:   Review of patient's allergies indicates no known allergies. Review of Systems:     Review of Systems   Constitutional: Negative. HENT: Negative. Eyes: Negative. Respiratory: Negative. Cardiovascular: Negative. Gastrointestinal: Negative. Endocrine: Negative. Genitourinary: Negative. Musculoskeletal:        Left foot redness    Skin: Positive for rash. Allergic/Immunologic: Negative. Neurological: Positive for headaches. Hematological: Negative. Psychiatric/Behavioral: The patient is nervous/anxious. Physical Examination :   Patient Vitals for the past 8 hrs:   BP Temp Temp src Pulse Resp SpO2   01/31/18 0714 (!) 106/55 97.3 °F (36.3 °C) Oral 74 14 93 %       Physical Exam   Constitutional: She is oriented to person, place, and time. She appears distressed. HENT:   Head: Normocephalic and atraumatic. Eyes: Conjunctivae are normal.   Neck: Normal range of motion. Cardiovascular: Normal rate, regular rhythm and normal heart sounds. Exam reveals no gallop and no friction rub. No murmur heard. Pulmonary/Chest: Effort normal and breath sounds normal. No respiratory distress. She has no wheezes. She has no rales. Abdominal: Soft. Bowel sounds are normal.   Musculoskeletal: Normal range of motion. Left hallux gangrene, dry   Neurological: She is alert and oriented to person, place, and time. Skin: Skin is warm and dry.    Psychiatric: Mood, affect and judgment normal.

## 2018-02-01 LAB
ALBUMIN SERPL-MCNC: 3.8 G/DL (ref 3.5–5.2)
ALBUMIN/GLOBULIN RATIO: 1.1 (ref 1–2.5)
ALP BLD-CCNC: 76 U/L (ref 35–104)
ALT SERPL-CCNC: 17 U/L (ref 5–33)
AST SERPL-CCNC: 18 U/L
BILIRUB SERPL-MCNC: <0.1 MG/DL (ref 0.3–1.2)
BILIRUBIN DIRECT: <0.08 MG/DL
BILIRUBIN, INDIRECT: ABNORMAL MG/DL (ref 0–1)
GLOBULIN: ABNORMAL G/DL (ref 1.5–3.8)
INR BLD: 0.9
PARTIAL THROMBOPLASTIN TIME: 26.7 SEC (ref 21.3–31.3)
PROTHROMBIN TIME: 10 SEC (ref 9.4–12.6)
RHEUMATOID FACTOR: <10 IU/ML
TOTAL PROTEIN: 7.3 G/DL (ref 6.4–8.3)
VANCOMYCIN TROUGH DATE LAST DOSE: ABNORMAL
VANCOMYCIN TROUGH DOSE AMOUNT: ABNORMAL
VANCOMYCIN TROUGH TIME LAST DOSE: ABNORMAL
VANCOMYCIN TROUGH: 8.5 UG/ML (ref 10–20)

## 2018-02-01 PROCEDURE — 80202 ASSAY OF VANCOMYCIN: CPT

## 2018-02-01 PROCEDURE — 81001 URINALYSIS AUTO W/SCOPE: CPT

## 2018-02-01 PROCEDURE — 81240 F2 GENE: CPT

## 2018-02-01 PROCEDURE — 6370000000 HC RX 637 (ALT 250 FOR IP): Performed by: STUDENT IN AN ORGANIZED HEALTH CARE EDUCATION/TRAINING PROGRAM

## 2018-02-01 PROCEDURE — 75716 ARTERY X-RAYS ARMS/LEGS: CPT

## 2018-02-01 PROCEDURE — 86147 CARDIOLIPIN ANTIBODY EA IG: CPT

## 2018-02-01 PROCEDURE — 85610 PROTHROMBIN TIME: CPT

## 2018-02-01 PROCEDURE — 85300 ANTITHROMBIN III ACTIVITY: CPT

## 2018-02-01 PROCEDURE — 86431 RHEUMATOID FACTOR QUANT: CPT

## 2018-02-01 PROCEDURE — 36415 COLL VENOUS BLD VENIPUNCTURE: CPT

## 2018-02-01 PROCEDURE — 75774 ARTERY X-RAY EACH VESSEL: CPT

## 2018-02-01 PROCEDURE — 6360000002 HC RX W HCPCS: Performed by: INTERNAL MEDICINE

## 2018-02-01 PROCEDURE — 85247 CLOT FACTOR VIII MULTIMETRIC: CPT

## 2018-02-01 PROCEDURE — 80307 DRUG TEST PRSMV CHEM ANLYZR: CPT

## 2018-02-01 PROCEDURE — 85305 CLOT INHIBIT PROT S TOTAL: CPT

## 2018-02-01 PROCEDURE — 85302 CLOT INHIBIT PROT C ANTIGEN: CPT

## 2018-02-01 PROCEDURE — 2580000003 HC RX 258: Performed by: STUDENT IN AN ORGANIZED HEALTH CARE EDUCATION/TRAINING PROGRAM

## 2018-02-01 PROCEDURE — 86235 NUCLEAR ANTIGEN ANTIBODY: CPT

## 2018-02-01 PROCEDURE — 93970 EXTREMITY STUDY: CPT

## 2018-02-01 PROCEDURE — 93923 UPR/LXTR ART STDY 3+ LVLS: CPT

## 2018-02-01 PROCEDURE — 80076 HEPATIC FUNCTION PANEL: CPT

## 2018-02-01 PROCEDURE — 99232 SBSQ HOSP IP/OBS MODERATE 35: CPT | Performed by: INTERNAL MEDICINE

## 2018-02-01 PROCEDURE — 6360000002 HC RX W HCPCS: Performed by: STUDENT IN AN ORGANIZED HEALTH CARE EDUCATION/TRAINING PROGRAM

## 2018-02-01 PROCEDURE — 85220 BLOOC CLOT FACTOR V TEST: CPT

## 2018-02-01 PROCEDURE — 75625 CONTRAST EXAM ABDOMINL AORTA: CPT

## 2018-02-01 PROCEDURE — 85730 THROMBOPLASTIN TIME PARTIAL: CPT

## 2018-02-01 PROCEDURE — 85306 CLOT INHIBIT PROT S FREE: CPT

## 2018-02-01 PROCEDURE — 2580000003 HC RX 258: Performed by: INTERNAL MEDICINE

## 2018-02-01 PROCEDURE — 1200000000 HC SEMI PRIVATE

## 2018-02-01 PROCEDURE — 94762 N-INVAS EAR/PLS OXIMTRY CONT: CPT

## 2018-02-01 PROCEDURE — 36247 INS CATH ABD/L-EXT ART 3RD: CPT

## 2018-02-01 PROCEDURE — 86038 ANTINUCLEAR ANTIBODIES: CPT

## 2018-02-01 RX ORDER — NIFEDIPINE 30 MG/1
30 TABLET, EXTENDED RELEASE ORAL DAILY
Status: DISCONTINUED | OUTPATIENT
Start: 2018-02-01 | End: 2018-02-04

## 2018-02-01 RX ADMIN — GABAPENTIN 300 MG: 300 CAPSULE ORAL at 20:15

## 2018-02-01 RX ADMIN — MORPHINE SULFATE 4 MG: 2 INJECTION, SOLUTION INTRAMUSCULAR; INTRAVENOUS at 07:22

## 2018-02-01 RX ADMIN — GABAPENTIN 300 MG: 300 CAPSULE ORAL at 09:40

## 2018-02-01 RX ADMIN — CLOBETASOL PROPIONATE: 0.5 OINTMENT TOPICAL at 09:40

## 2018-02-01 RX ADMIN — MORPHINE SULFATE 4 MG: 2 INJECTION, SOLUTION INTRAMUSCULAR; INTRAVENOUS at 00:12

## 2018-02-01 RX ADMIN — MORPHINE SULFATE 4 MG: 2 INJECTION, SOLUTION INTRAMUSCULAR; INTRAVENOUS at 17:10

## 2018-02-01 RX ADMIN — MORPHINE SULFATE 4 MG: 2 INJECTION, SOLUTION INTRAMUSCULAR; INTRAVENOUS at 09:38

## 2018-02-01 RX ADMIN — OXYCODONE HYDROCHLORIDE AND ACETAMINOPHEN 1 TABLET: 5; 325 TABLET ORAL at 13:54

## 2018-02-01 RX ADMIN — SODIUM CHLORIDE: 9 INJECTION, SOLUTION INTRAVENOUS at 20:16

## 2018-02-01 RX ADMIN — MORPHINE SULFATE 4 MG: 2 INJECTION, SOLUTION INTRAMUSCULAR; INTRAVENOUS at 12:55

## 2018-02-01 RX ADMIN — SERTRALINE 25 MG: 25 TABLET, FILM COATED ORAL at 20:15

## 2018-02-01 RX ADMIN — GABAPENTIN 300 MG: 300 CAPSULE ORAL at 13:56

## 2018-02-01 RX ADMIN — OXYCODONE HYDROCHLORIDE AND ACETAMINOPHEN 1 TABLET: 5; 325 TABLET ORAL at 02:24

## 2018-02-01 RX ADMIN — OXYCODONE HYDROCHLORIDE AND ACETAMINOPHEN 1 TABLET: 5; 325 TABLET ORAL at 20:15

## 2018-02-01 RX ADMIN — CLOBETASOL PROPIONATE: 0.5 OINTMENT TOPICAL at 20:16

## 2018-02-01 RX ADMIN — NIFEDIPINE 30 MG: 30 TABLET, FILM COATED, EXTENDED RELEASE ORAL at 17:14

## 2018-02-01 RX ADMIN — ENOXAPARIN SODIUM 40 MG: 40 INJECTION SUBCUTANEOUS at 20:16

## 2018-02-01 RX ADMIN — VANCOMYCIN HYDROCHLORIDE 750 MG: 1 INJECTION, POWDER, LYOPHILIZED, FOR SOLUTION INTRAVENOUS at 12:56

## 2018-02-01 ASSESSMENT — PAIN SCALES - GENERAL
PAINLEVEL_OUTOF10: 9
PAINLEVEL_OUTOF10: 10
PAINLEVEL_OUTOF10: 10
PAINLEVEL_OUTOF10: 9
PAINLEVEL_OUTOF10: 8
PAINLEVEL_OUTOF10: 9
PAINLEVEL_OUTOF10: 7
PAINLEVEL_OUTOF10: 9

## 2018-02-01 ASSESSMENT — ENCOUNTER SYMPTOMS
GASTROINTESTINAL NEGATIVE: 1
RESPIRATORY NEGATIVE: 1
EYE REDNESS: 1
ALLERGIC/IMMUNOLOGIC NEGATIVE: 1

## 2018-02-01 NOTE — PROGRESS NOTES
[facility] - Initial Consult Note  Today's Date and Time: 2/1/2018, 4:37 PM  admission date 1/29/2018  Impression :   Current:    · Osteomyelitis and gangrene of left great toe - Xray shows the osteo of the hallux  · psorias of the skin - off therapy  · Rash left foot - psoriatic,   · Possible left foot dorsal cellulitis  · Small arterial disease, smoker, possible Buerger's disease     Other:  ·    Discussion    · Pt with history of foot trauma presents to the hospital with necrosis of left great toe  · Vascular workup poor small vessel supply;    · X-ray of left foot showing suspected  osteomyelitis / gangrene of the distal phalanx of left great toe   · Left foot red mostly from the psoriasis  Recommendations   · Will continue vanco for now - will disc w DR Cydney Clemente  · Get a vanco trough  · Suspect the redness of the foot is likely psoriasis mostly, but the toe is today a little redder and tender likely from the gangrene  · Will follow recommendations per vascular   · Long disc w pt - smoking counselling - wants to stop  · Disc x w pt and      Infection Control Recommendations   · [place] Precautions  Antimicrobial Stewardship Recommendations   · Simplification of therapy  Chief complaint/reason for consultation:   Left foot pain; possible osteomyeltis; not feeling well      History of Present Illness:   Initial history:  Dari Auguste is a 30y.o.-year-old  female who was initially admitted on 1/29/18. She presented to the hospital complaining of pain and swelling in her left big toe. Patient reports that she is a patient of Dr. Tamanna Matt who sent her to the emergency room for worsening pain and swelling in her left foot. Patient reports that she had a toenail infection approximately 6 months ago and had her toenail removed by Dr. Tamanna Matt. She states that her left big toe turned black after having the toenail removed.  She was seen in [facility] about four Psoriasis     Scoliosis        Past Surgical  History:     Past Surgical History:   Procedure Laterality Date     SECTION         Medications:      NIFEdipine  30 mg Oral Daily    vancomycin  750 mg Intravenous Q12H    clobetasol   Topical BID    gabapentin  300 mg Oral TID    sertraline  25 mg Oral Nightly    sodium chloride flush  10 mL Intravenous 2 times per day    enoxaparin  40 mg Subcutaneous Daily       Social History:     Social History     Social History    Marital status: Single     Spouse name: N/A    Number of children: N/A    Years of education: N/A     Occupational History    Not on file. Social History Main Topics    Smoking status: Current Every Day Smoker     Packs/day: 1.00     Years: 15.00    Smokeless tobacco: Never Used    Alcohol use No    Drug use: Yes     Types: Marijuana    Sexual activity: Yes     Partners: Male     Other Topics Concern    Not on file     Social History Narrative    No narrative on file       Family History:     Family History   Problem Relation Age of Onset    Cancer Mother     Diabetes Maternal Grandmother     Stroke Maternal Grandmother         Allergies:   Penicillins     Review of Systems:     Review of Systems   Constitutional: Negative. HENT: Negative. Eyes: Positive for redness. Respiratory: Negative. Cardiovascular: Negative. Gastrointestinal: Negative. Endocrine: Negative. Negative for polyphagia. Genitourinary: Negative. Musculoskeletal: Positive for arthralgias. Left foot redness and toe pain   Skin: Positive for rash. Allergic/Immunologic: Negative. Neurological: Negative for weakness and headaches. Hematological: Negative. Psychiatric/Behavioral: The patient is nervous/anxious.         Physical Examination :     Patient Vitals for the past 8 hrs:   BP Temp Temp src Pulse SpO2   18 1521 118/83 97.9 °F (36.6 °C) Oral 76 98 %       Physical Exam   Constitutional: She is oriented to the key elements of all parts of the encounter have been performed by me. I agree with the assessment, plan and orders as documented by the resident.     Nadege Sifuentes, Infectious Diseases

## 2018-02-01 NOTE — PROGRESS NOTES
Buerger's disease  -Psoriasis     Active Problems:    Gangrene of toe of left foot (HCC)    Cellulitis of left foot      PLAN:     Patient examined and evaluated. Diagnosis and treatment options discussed in detail.   Betadine applied to Left hallux  Continue IV vanc per ID  Will monitor progression of ischemia to left hallux and cellulitis   Await vein mapping and vascular recommendations for possible bypass     Diet- General  Activity- WBAT with surgical shoe to left  DVT prophylaxis- Lovenox   PT/OT- eval  - discharge planning   Discussed with Dr. Noemy Rosen        Electronically signed by Teo Snyder DPM  on 2/1/2018 at 10:18 AM

## 2018-02-01 NOTE — PROGRESS NOTES
Vascular Surgery Progress Note            PATIENT NAME: Johanne Zendejas Hand     TODAY'S DATE: 2018, 8:50 AM    CC: Painful non-healing left great toe wound    SUBJECTIVE:    Pt seen and examined, no acute events overnight. States her left hallux does not feel hard anymore and is more soft when she touches it  Reports increased sensation to left hallux. Denies cp/sob. HPI: Pt is a 30 y.o. female, with PMH including psoriasis (likely on methotrexate), tobacco abuse, and chronic non-healing wound to L 1st toe. She presents to ED with worsening foot pain, swelling, and redness since the weekend. Patient stubbed her toe on a wood pile 6 months ago and had her toenail removed by podiatry. At the time she was on medication for her psoriasis and the wound never healed. She was taken off psoriasis medication and the wound still persisted. She has never had any swelling like this and the pain is progressively worsening. She is still able to ambulate. Patient denies any fever, chills, cp, sob, abd pain, or diarrhea. She denies any claudication symptoms.      Patient reports tobacco abuse since 17 y/o, she has cut back to 6 cigarettes a day. She denies heavy ETOH use or use of illicit drugs.       OBJECTIVE:   VITALS:  /69   Pulse 85   Temp 98.1 °F (36.7 °C) (Oral)   Resp 22   Ht 4' 11\" (1.499 m)   Wt 108 lb (49 kg)   SpO2 100%   BMI 21.81 kg/m²  I Temperature Max - Temp  Av °F (36.7 °C)  Min: 97.8 °F (36.6 °C)  Max: 98.2 °F (36.8 °C)      LABS:  Lab Results   Component Value Date    WBC 9.6 2018    HGB 12.9 2018    HCT 40.2 2018     2018       Lab Results   Component Value Date     2018    K 3.9 2018     2018    CO2 25 2018    BUN 12 2018    CREATININE 0.41 2018    GLUCOSE 93 2018       No results found for: ALKPHOS, ALT, AST, PROT, BILITOT, BILIDIR, IBILI, LABALBU    No results found for: PROTIME, INR    Calcium: Lab Results   Component Value Date    CALCIUM 8.8 01/30/2018     Ionized Calcium:  No results found for: IONCA  Magnesium:  No results found for: MG  Phosphorus:  No results found for: PHOS  Albumin:  No results found for: LABALBU    L foot XR 1/29/18  FINDINGS:   Normal alignment of the bones.  No acute fracture.       There is a subtle cortical lucency and irregularity involving the tip of the   distal phalanx great toe with surrounding soft tissue swelling.  Cellulitis   and osteomyelitis not excluded.  Consider further imaging with MRI. General: AOx3, NAD  Heart: RRR   Lungs: Symmetric rise and fall of chest with inspiration, non-labored  Abdomen: Soft, nontender, nondistended  Extremity: Right DP/PT palpable. Left DP/PT non-palpable. Left PT doppler signal, no DP signal on doppler. Left popliteal triphasic on doppler. Left hallux toenail absent with overlying hyperkeratotic/necrotic tissue, tender to palpation. No drainage or malodor noted. Dorsum of left foot +edema/erythema extending to level of ankle. No crepitus or induration noted. Multiple scaling plaques consistent with pt h/o psoriasis noted to b/l LE and UE. Narrative [facility]    Vascular Lower Arterial Plethysmography Procedure        Patient Name    HAND        Date of Study             01/30/2018                    ADILENESAM        Date of Birth   1980  Gender                    Female        Age             37 year(s)  Race                              Room Number     2007        Corporate ID #  [id number]        Patient Acct # [de-identified]        MR #            [id number]     Sonographer               Kristen Taylor        Accession #     [id number]   Interpreting Physician   Jaime Camacho        Referring Nurse             Referring Physician       Jose Armando Black DPM    Practitioner       Procedure   Type of Study:        Extremities Arteries: Lower Arterial Plethysmography, PVR Lower with PPG. +---------------+---------------+------------+------------+----------------+   ! Location       !Pressure       !Ratio       !Notch       !Amplitude       !   +---------------+---------------+------------+------------+----------------+   ! Calf           !108            !0.9         !            !                !   +---------------+---------------+------------+------------+----------------+   ! Ankle PT       !107            !0.89        !            !                !   +---------------+---------------+------------+------------+----------------+   ! Ankle DP       !121            !1.01        !            !                !   +---------------+---------------+------------+------------+----------------+         - Right Brachial Pressure:113.         - Right OPAL:1.01.       Left Plethysmographic Results   +---------------+---------------+------------+------------+----------------+   ! Left           !               !            !            !                !   +---------------+---------------+------------+------------+----------------+   ! Location       !Pressure       !Ratio       !Notch       !Amplitude       !   +---------------+---------------+------------+------------+----------------+   ! Calf           !109            !0.91        !            !                !   +---------------+---------------+------------+------------+----------------+   ! Ankle PT       !94             !0.78        !            !                !   +---------------+---------------+------------+------------+----------------+   ! Ankle DP       !37             !0.31        !            !                !   +---------------+---------------+------------+------------+----------------+         - Left Brachial Pressure:120.         - Left OPAL:0.78.       Plethysmographic Digit Evaluation   +---------++--------+-----+---------------++--------+-----+----------------+   !         ! ! Right   !     ! Left           !!        !     !                !

## 2018-02-02 LAB
-: NORMAL
AMORPHOUS: NORMAL
AMPHETAMINE SCREEN URINE: NEGATIVE
ANTI-CENTROMERE: 8 U/ML
ANTI-NUCLEAR ANTIBODY (ANA): NEGATIVE
ANTI-SCLERODERMA: 8 U/ML
AT-III ACTIVITY: 102 % (ref 83–122)
BACTERIA: NORMAL
BARBITURATE SCREEN URINE: POSITIVE
BENZODIAZEPINE SCREEN, URINE: NEGATIVE
BILIRUBIN URINE: NEGATIVE
BUPRENORPHINE URINE: ABNORMAL
CANNABINOID SCREEN URINE: NEGATIVE
CASTS UA: NORMAL /LPF (ref 0–8)
COCAINE METABOLITE, URINE: NEGATIVE
COLOR: YELLOW
COMMENT UA: ABNORMAL
CRYSTALS, UA: NORMAL /HPF
EPITHELIAL CELLS UA: NORMAL /HPF (ref 0–5)
FACTOR V ACTIVITY: 112 % (ref 50–150)
GLUCOSE URINE: NEGATIVE
KETONES, URINE: NEGATIVE
LEUKOCYTE ESTERASE, URINE: NEGATIVE
LV EF: 55 %
LVEF MODALITY: NORMAL
MDMA URINE: ABNORMAL
METHADONE SCREEN, URINE: NEGATIVE
METHAMPHETAMINE, URINE: ABNORMAL
MUCUS: NORMAL
NITRITE, URINE: NEGATIVE
OPIATES, URINE: POSITIVE
OTHER OBSERVATIONS UA: NORMAL
OXYCODONE SCREEN URINE: POSITIVE
PH UA: 7.5 (ref 5–8)
PHENCYCLIDINE, URINE: NEGATIVE
PROPOXYPHENE, URINE: ABNORMAL
PROTEIN S ACTIVITY: 75 % (ref 59–130)
PROTEIN UA: NEGATIVE
RBC UA: NORMAL /HPF (ref 0–4)
RENAL EPITHELIAL, UA: NORMAL /HPF
SPECIFIC GRAVITY UA: 1.02 (ref 1–1.03)
TEST INFORMATION: ABNORMAL
TRICHOMONAS: NORMAL
TRICYCLIC ANTIDEPRESSANTS, UR: ABNORMAL
TURBIDITY: CLEAR
URINE HGB: ABNORMAL
UROBILINOGEN, URINE: NORMAL
WBC UA: NORMAL /HPF (ref 0–5)
YEAST: NORMAL

## 2018-02-02 PROCEDURE — 2580000003 HC RX 258: Performed by: STUDENT IN AN ORGANIZED HEALTH CARE EDUCATION/TRAINING PROGRAM

## 2018-02-02 PROCEDURE — 99232 SBSQ HOSP IP/OBS MODERATE 35: CPT | Performed by: INTERNAL MEDICINE

## 2018-02-02 PROCEDURE — 6360000002 HC RX W HCPCS: Performed by: INTERNAL MEDICINE

## 2018-02-02 PROCEDURE — 93306 TTE W/DOPPLER COMPLETE: CPT

## 2018-02-02 PROCEDURE — 6370000000 HC RX 637 (ALT 250 FOR IP): Performed by: STUDENT IN AN ORGANIZED HEALTH CARE EDUCATION/TRAINING PROGRAM

## 2018-02-02 PROCEDURE — 94762 N-INVAS EAR/PLS OXIMTRY CONT: CPT

## 2018-02-02 PROCEDURE — 6360000002 HC RX W HCPCS: Performed by: STUDENT IN AN ORGANIZED HEALTH CARE EDUCATION/TRAINING PROGRAM

## 2018-02-02 PROCEDURE — 6360000002 HC RX W HCPCS: Performed by: PODIATRIST

## 2018-02-02 PROCEDURE — 1200000000 HC SEMI PRIVATE

## 2018-02-02 PROCEDURE — 2580000003 HC RX 258: Performed by: INTERNAL MEDICINE

## 2018-02-02 RX ORDER — CLOPIDOGREL BISULFATE 75 MG/1
75 TABLET ORAL DAILY
Status: DISCONTINUED | OUTPATIENT
Start: 2018-02-02 | End: 2018-02-04

## 2018-02-02 RX ORDER — OXYCODONE HYDROCHLORIDE AND ACETAMINOPHEN 5; 325 MG/1; MG/1
2 TABLET ORAL EVERY 4 HOURS PRN
Status: DISCONTINUED | OUTPATIENT
Start: 2018-02-02 | End: 2018-02-08

## 2018-02-02 RX ORDER — PROMETHAZINE HYDROCHLORIDE 25 MG/ML
12.5 INJECTION, SOLUTION INTRAMUSCULAR; INTRAVENOUS ONCE
Status: COMPLETED | OUTPATIENT
Start: 2018-02-02 | End: 2018-02-02

## 2018-02-02 RX ADMIN — OXYCODONE HYDROCHLORIDE AND ACETAMINOPHEN 2 TABLET: 5; 325 TABLET ORAL at 14:07

## 2018-02-02 RX ADMIN — APIXABAN 10 MG: 5 TABLET, FILM COATED ORAL at 21:33

## 2018-02-02 RX ADMIN — MORPHINE SULFATE 4 MG: 2 INJECTION, SOLUTION INTRAMUSCULAR; INTRAVENOUS at 02:02

## 2018-02-02 RX ADMIN — MORPHINE SULFATE 4 MG: 2 INJECTION, SOLUTION INTRAMUSCULAR; INTRAVENOUS at 09:25

## 2018-02-02 RX ADMIN — APIXABAN 10 MG: 5 TABLET, FILM COATED ORAL at 11:20

## 2018-02-02 RX ADMIN — MORPHINE SULFATE 4 MG: 2 INJECTION, SOLUTION INTRAMUSCULAR; INTRAVENOUS at 06:55

## 2018-02-02 RX ADMIN — OXYCODONE HYDROCHLORIDE AND ACETAMINOPHEN 2 TABLET: 5; 325 TABLET ORAL at 08:33

## 2018-02-02 RX ADMIN — CLOPIDOGREL 75 MG: 75 TABLET, FILM COATED ORAL at 11:49

## 2018-02-02 RX ADMIN — GABAPENTIN 300 MG: 300 CAPSULE ORAL at 15:49

## 2018-02-02 RX ADMIN — VANCOMYCIN HYDROCHLORIDE 750 MG: 1 INJECTION, POWDER, LYOPHILIZED, FOR SOLUTION INTRAVENOUS at 02:08

## 2018-02-02 RX ADMIN — CLOBETASOL PROPIONATE: 0.5 OINTMENT TOPICAL at 21:33

## 2018-02-02 RX ADMIN — SERTRALINE 25 MG: 25 TABLET, FILM COATED ORAL at 21:33

## 2018-02-02 RX ADMIN — NIFEDIPINE 30 MG: 30 TABLET, FILM COATED, EXTENDED RELEASE ORAL at 08:34

## 2018-02-02 RX ADMIN — VANCOMYCIN HYDROCHLORIDE 750 MG: 1 INJECTION, POWDER, LYOPHILIZED, FOR SOLUTION INTRAVENOUS at 13:29

## 2018-02-02 RX ADMIN — OXYCODONE HYDROCHLORIDE AND ACETAMINOPHEN 1 TABLET: 5; 325 TABLET ORAL at 00:46

## 2018-02-02 RX ADMIN — PROMETHAZINE HYDROCHLORIDE 12.5 MG: 25 INJECTION INTRAMUSCULAR; INTRAVENOUS at 21:16

## 2018-02-02 RX ADMIN — ONDANSETRON 4 MG: 2 INJECTION INTRAMUSCULAR; INTRAVENOUS at 19:22

## 2018-02-02 RX ADMIN — GABAPENTIN 300 MG: 300 CAPSULE ORAL at 21:33

## 2018-02-02 RX ADMIN — CLOBETASOL PROPIONATE: 0.5 OINTMENT TOPICAL at 09:27

## 2018-02-02 RX ADMIN — GABAPENTIN 300 MG: 300 CAPSULE ORAL at 08:34

## 2018-02-02 RX ADMIN — MORPHINE SULFATE 4 MG: 2 INJECTION, SOLUTION INTRAMUSCULAR; INTRAVENOUS at 21:16

## 2018-02-02 RX ADMIN — ONDANSETRON 4 MG: 2 INJECTION INTRAMUSCULAR; INTRAVENOUS at 02:01

## 2018-02-02 RX ADMIN — MORPHINE SULFATE 4 MG: 2 INJECTION, SOLUTION INTRAMUSCULAR; INTRAVENOUS at 17:50

## 2018-02-02 RX ADMIN — Medication 10 ML: at 21:28

## 2018-02-02 RX ADMIN — MORPHINE SULFATE 2 MG: 2 INJECTION, SOLUTION INTRAMUSCULAR; INTRAVENOUS at 11:58

## 2018-02-02 ASSESSMENT — PAIN SCALES - GENERAL
PAINLEVEL_OUTOF10: 8
PAINLEVEL_OUTOF10: 8
PAINLEVEL_OUTOF10: 10
PAINLEVEL_OUTOF10: 0
PAINLEVEL_OUTOF10: 9
PAINLEVEL_OUTOF10: 9
PAINLEVEL_OUTOF10: 6
PAINLEVEL_OUTOF10: 9

## 2018-02-02 ASSESSMENT — PAIN DESCRIPTION - ORIENTATION
ORIENTATION: LEFT
ORIENTATION: LEFT

## 2018-02-02 ASSESSMENT — PAIN DESCRIPTION - ONSET: ONSET: ON-GOING

## 2018-02-02 ASSESSMENT — ENCOUNTER SYMPTOMS
RESPIRATORY NEGATIVE: 1
EYE REDNESS: 1
ALLERGIC/IMMUNOLOGIC NEGATIVE: 1
GASTROINTESTINAL NEGATIVE: 1

## 2018-02-02 ASSESSMENT — PAIN DESCRIPTION - LOCATION
LOCATION: TOE (COMMENT WHICH ONE)
LOCATION: FOOT

## 2018-02-02 ASSESSMENT — PAIN DESCRIPTION - PAIN TYPE
TYPE: ACUTE PAIN
TYPE: CHRONIC PAIN

## 2018-02-02 ASSESSMENT — PAIN DESCRIPTION - FREQUENCY: FREQUENCY: CONTINUOUS

## 2018-02-02 ASSESSMENT — PAIN DESCRIPTION - DESCRIPTORS: DESCRIPTORS: CONSTANT;SHARP

## 2018-02-02 NOTE — PROGRESS NOTES
tissue, tender to palpation. No drainage or malodor noted. Dorsum of left foot +edema/erythema extending to level of ankle. Increased erythema of dorsum of left foot associated with tenderness to palpation. No crepitus or induration noted. Multiple scaling plaques consistent with pt h/o psoriasis noted to b/l LE and UE. IMAGING   2/1/2018: TCPO2   Abnormal value at Electrode(s) C, D and E which may impede spontaneous    wound healing     2/1/2018: Vein mapping    No evidence of superficial or deep venous thrombosis in both lower    extremities.    Vein sizes are listed in the table above. ASSESSMENT   Active Problems:    Gangrene of toe of left foot (HCC)    Cellulitis of left foot    31 yo female with   1. chronic non-healing wound to L hallux  2. Osteomyelitis of L hallux   3. Psoriasis  4. H/o tobacco abuse  5. Possible Buerger's disease      PLAN  1. Smoking cessation counseling - emphasized importance to patient   2. Continue nifedipine   3. Begin eliquis 10 mg  4. Begin plavix 75 mg   5. Continue vascular checks with vitals   6. F/u for autoimmune testing with rheumatology as outpatient   7. ID following for abx - on vanc  8. Podiatry following for wound healing     Vascular Surgery Resident Statement/Note:  I have discussed the case, including pertinent history and exam findings with the above medical student have personally seen the patient. Pt seen and examined at bedside. I performed both history and physical exam.     Pt having increased pain likely due to increased reperfusion secondary to starting Nifedipine. DP/PT pulses left foot dopplerable this AM. Start Eliquis and plavix. Pain medication increased to 2 percocet q4h. [id number] [place] for patient to shower. F/u autoimmune labs for further rheumatology workup as OP. Note was edited and changes made by me. Assessment and plan reviewed and changes made by me. I agree with the assessment and plan as stated above.     Electronically signed by

## 2018-02-02 NOTE — PROGRESS NOTES
X-ray suggesting cellulitis and osteomyelitis of the distak phalanx of left great toe. Doppler venous:  No evidence of superficial or deep venous thrombosis in the right common   femoral vein or in the left lower extremity. Xray left foot:  Suspect cellulitis and osteomyelitis of the distal phalanx left great toe. Consider further evaluation with MRI  Art dopplers:  Right:    Pressure differential suggest aortoiliac disease.    Toe brachial index suggest mild to moderate vascular insufficiency at    rest.        Left:    Near flat line toe pressures suggesting significant distal vascular    disease. Although ankle-brachial index suggest only mild vascular    insufficiency interpret the study with caution.             I have personally reviewed the past medical history, past surgical history, medications, social history, and family history, and I have updated the database accordingly. Past Medical History:     Past Medical History:   Diagnosis Date    Chronic back pain     Psoriasis     Scoliosis        Past Surgical  History:     Past Surgical History:   Procedure Laterality Date     SECTION         Medications:      clopidogrel  75 mg Oral Daily    apixaban  10 mg Oral BID    NIFEdipine  30 mg Oral Daily    vancomycin  750 mg Intravenous Q12H    clobetasol   Topical BID    gabapentin  300 mg Oral TID    sertraline  25 mg Oral Nightly    sodium chloride flush  10 mL Intravenous 2 times per day       Social History:     Social History     Social History    Marital status: Single     Spouse name: N/A    Number of children: N/A    Years of education: N/A     Occupational History    Not on file.      Social History Main Topics    Smoking status: Current Every Day Smoker     Packs/day: 1.00     Years: 15.00    Smokeless tobacco: Never Used    Alcohol use No    Drug use: Yes     Types: Marijuana    Sexual activity: Yes     Partners: Male     Other Topics Concern    Not on file and oriented to person, place, and time. Skin: Skin is warm and dry. There is erythema. Psychiatric: Affect and judgment normal.         Medical Decision Making:   I have independently reviewed/ordered the following labs:    CBC with Differential:   No results for input(s): WBC, HGB, HCT, PLT, SEGSPCT, BANDSPCT, LYMPHOPCT, MONOPCT, EOSPCT in the last 72 hours. BMP:   No results for input(s): NA, K, CL, CO2, BUN, CREATININE, MG in the last 72 hours. Invalid input(s): CA  Hepatic Function Panel:   Recent Labs      02/01/18   1811   PROT  7.3   LABALBU  3.8   BILIDIR  <0.08   IBILI  CANNOT BE CALCULATED   BILITOT  <0.10*   ALKPHOS  76   ALT  17   AST  18     No results for input(s): RPR in the last 72 hours. No results for input(s): HIV in the last 72 hours. No results for input(s): BC in the last 72 hours. Lab Results   Component Value Date    RBC 4.11 01/30/2018    WBC 9.6 01/30/2018     Lab Results   Component Value Date    CREATININE 0.41 01/30/2018    GLUCOSE 93 01/30/2018       Detailed results: Thank you for allowing us to participate in the care of this patient. Please call with questions. Sammy Garcia MD  Office: [phone]      I have discussed the care of the patient, including pertinent history and exam findings,  with the resident. I have seen and examined the patient and the key elements of all parts of the encounter have been performed by me. I agree with the assessment, plan and orders as documented by the resident.     Nadege Sifuentes, Infectious Diseases

## 2018-02-02 NOTE — PROGRESS NOTES
Podiatry Progress Note    SUBJECTIVE:     Pt seen and examined at bedside. Pt complains of continued pain to her big toe. States that she would like to keep the toe open with no dressing. Denies N/F/V/C    Review of Systems  All systems reviewed as negative unless otherwise stated in HPI    PHYSICAL EXAM:     Vitals:   Vitals:    02/02/18 0715   BP: (!) 99/53   Pulse: 78   Resp: 14   Temp: 98.1 °F (36.7 °C)   SpO2:      General: AAO x 3 in NAD. Lower Extremity Physical Exam:    Vascular: DP/PT pulses are weakly palpable, Right. DP/PT pulses non-palpable, left; audible on doppler. CFT <3 seconds to all digits, campos. Neurological: Epicritic sensation intact to level of the digits, bilateral.    Musculoskeletal: Muscle strength 5/5 for all lower extremity muscle groups, Bilateral.  Pain present upon palpation of left hallux and midfoot. Dermatologic: Left foot noted to be cooler compared to right. Multiple psoriatic plaques noted diffusely to bilateral lower extremities. Left hallux nail is absent. Nail bed is necrotic extending to the IPJ. Blanchable erythema extending from left distal digits to proximal midfoot - improving. No other open lesions of interdigital maceration noted campos.     Labs:  Lab Results   Component Value Date    WBC 9.6 01/30/2018    HGB 12.9 01/30/2018    HCT 40.2 01/30/2018     01/30/2018    LYMPHOPCT 41 01/30/2018    MONOPCT 3 01/30/2018    BASOPCT 1 01/30/2018     Lab Results   Component Value Date     01/30/2018    K 3.9 01/30/2018     01/30/2018    CO2 25 01/30/2018    BUN 12 01/30/2018    CREATININE 0.41 (L) 01/30/2018    GLUCOSE 93 01/30/2018    CALCIUM 8.8 01/30/2018         ASSESSMENT:     Patient is a 30 y.o. female with  -Dry gangrene Left hallux  -Osteomyelitis of left great toe   -PAD; possible Buerger's disease  -Psoriasis     Active Problems:    Gangrene of toe of left foot (HCC)    Cellulitis of left foot      PLAN:     Patient examined and

## 2018-02-02 NOTE — PLAN OF CARE
Problem: Falls - Risk of  Goal: Absence of falls  Outcome: Ongoing  Call light in reach; bed locked in lowest position; bed alarm on; hourly rounding.

## 2018-02-03 LAB
ABSOLUTE EOS #: 0.6 K/UL (ref 0–0.44)
ABSOLUTE IMMATURE GRANULOCYTE: 0.04 K/UL (ref 0–0.3)
ABSOLUTE LYMPH #: 2.37 K/UL (ref 1.1–3.7)
ABSOLUTE MONO #: 0.82 K/UL (ref 0.1–1.2)
ANION GAP SERPL CALCULATED.3IONS-SCNC: 12 MMOL/L (ref 9–17)
BASOPHILS # BLD: 1 % (ref 0–2)
BASOPHILS ABSOLUTE: 0.05 K/UL (ref 0–0.2)
BUN BLDV-MCNC: 12 MG/DL (ref 6–20)
BUN/CREAT BLD: ABNORMAL (ref 9–20)
CALCIUM SERPL-MCNC: 9 MG/DL (ref 8.6–10.4)
CHLORIDE BLD-SCNC: 99 MMOL/L (ref 98–107)
CO2: 26 MMOL/L (ref 20–31)
CREAT SERPL-MCNC: 0.35 MG/DL (ref 0.5–0.9)
DIFFERENTIAL TYPE: ABNORMAL
EOSINOPHILS RELATIVE PERCENT: 6 % (ref 1–4)
GFR AFRICAN AMERICAN: >60 ML/MIN
GFR NON-AFRICAN AMERICAN: >60 ML/MIN
GFR SERPL CREATININE-BSD FRML MDRD: ABNORMAL ML/MIN/{1.73_M2}
GFR SERPL CREATININE-BSD FRML MDRD: ABNORMAL ML/MIN/{1.73_M2}
GLUCOSE BLD-MCNC: 94 MG/DL (ref 70–99)
HCT VFR BLD CALC: 37.7 % (ref 36.3–47.1)
HEMOGLOBIN: 12.5 G/DL (ref 11.9–15.1)
IMMATURE GRANULOCYTES: 0 %
LYMPHOCYTES # BLD: 25 % (ref 24–43)
MCH RBC QN AUTO: 31.3 PG (ref 25.2–33.5)
MCHC RBC AUTO-ENTMCNC: 33.2 G/DL (ref 28.4–34.8)
MCV RBC AUTO: 94.3 FL (ref 82.6–102.9)
MONOCYTES # BLD: 9 % (ref 3–12)
NRBC AUTOMATED: 0 PER 100 WBC
PDW BLD-RTO: 13.1 % (ref 11.8–14.4)
PLATELET # BLD: 359 K/UL (ref 138–453)
PLATELET ESTIMATE: ABNORMAL
PMV BLD AUTO: 9.2 FL (ref 8.1–13.5)
POTASSIUM SERPL-SCNC: 4.1 MMOL/L (ref 3.7–5.3)
PROTEIN S ANTIGEN, TOTAL: 115 % (ref 63–126)
RBC # BLD: 4 M/UL (ref 3.95–5.11)
RBC # BLD: ABNORMAL 10*6/UL
SEG NEUTROPHILS: 59 % (ref 36–65)
SEGMENTED NEUTROPHILS ABSOLUTE COUNT: 5.65 K/UL (ref 1.5–8.1)
SODIUM BLD-SCNC: 137 MMOL/L (ref 135–144)
VANCOMYCIN TROUGH DATE LAST DOSE: ABNORMAL
VANCOMYCIN TROUGH DOSE AMOUNT: ABNORMAL
VANCOMYCIN TROUGH TIME LAST DOSE: ABNORMAL
VANCOMYCIN TROUGH: 5.6 UG/ML (ref 10–20)
WBC # BLD: 9.5 K/UL (ref 3.5–11.3)
WBC # BLD: ABNORMAL 10*3/UL

## 2018-02-03 PROCEDURE — 2580000003 HC RX 258: Performed by: INTERNAL MEDICINE

## 2018-02-03 PROCEDURE — 94762 N-INVAS EAR/PLS OXIMTRY CONT: CPT

## 2018-02-03 PROCEDURE — 85025 COMPLETE CBC W/AUTO DIFF WBC: CPT

## 2018-02-03 PROCEDURE — 6370000000 HC RX 637 (ALT 250 FOR IP): Performed by: STUDENT IN AN ORGANIZED HEALTH CARE EDUCATION/TRAINING PROGRAM

## 2018-02-03 PROCEDURE — 80202 ASSAY OF VANCOMYCIN: CPT

## 2018-02-03 PROCEDURE — 93005 ELECTROCARDIOGRAM TRACING: CPT

## 2018-02-03 PROCEDURE — 80048 BASIC METABOLIC PNL TOTAL CA: CPT

## 2018-02-03 PROCEDURE — 6360000002 HC RX W HCPCS: Performed by: STUDENT IN AN ORGANIZED HEALTH CARE EDUCATION/TRAINING PROGRAM

## 2018-02-03 PROCEDURE — 36415 COLL VENOUS BLD VENIPUNCTURE: CPT

## 2018-02-03 PROCEDURE — 2580000003 HC RX 258: Performed by: STUDENT IN AN ORGANIZED HEALTH CARE EDUCATION/TRAINING PROGRAM

## 2018-02-03 PROCEDURE — 6360000002 HC RX W HCPCS: Performed by: INTERNAL MEDICINE

## 2018-02-03 PROCEDURE — 6370000000 HC RX 637 (ALT 250 FOR IP): Performed by: INTERNAL MEDICINE

## 2018-02-03 PROCEDURE — 1200000000 HC SEMI PRIVATE

## 2018-02-03 PROCEDURE — 99232 SBSQ HOSP IP/OBS MODERATE 35: CPT | Performed by: INTERNAL MEDICINE

## 2018-02-03 RX ORDER — LINEZOLID 600 MG/1
600 TABLET, FILM COATED ORAL EVERY 12 HOURS SCHEDULED
Status: DISCONTINUED | OUTPATIENT
Start: 2018-02-03 | End: 2018-02-06

## 2018-02-03 RX ORDER — KETOROLAC TROMETHAMINE 30 MG/ML
30 INJECTION, SOLUTION INTRAMUSCULAR; INTRAVENOUS EVERY 6 HOURS
Status: DISCONTINUED | OUTPATIENT
Start: 2018-02-03 | End: 2018-02-05

## 2018-02-03 RX ORDER — CIPROFLOXACIN 500 MG/1
500 TABLET, FILM COATED ORAL EVERY 12 HOURS SCHEDULED
Status: DISCONTINUED | OUTPATIENT
Start: 2018-02-03 | End: 2018-02-06

## 2018-02-03 RX ORDER — KETOROLAC TROMETHAMINE 30 MG/ML
30 INJECTION, SOLUTION INTRAMUSCULAR; INTRAVENOUS EVERY 6 HOURS
Status: DISCONTINUED | OUTPATIENT
Start: 2018-02-03 | End: 2018-02-03

## 2018-02-03 RX ADMIN — OXYCODONE HYDROCHLORIDE AND ACETAMINOPHEN 2 TABLET: 5; 325 TABLET ORAL at 18:19

## 2018-02-03 RX ADMIN — LINEZOLID 600 MG: 600 TABLET, FILM COATED ORAL at 21:40

## 2018-02-03 RX ADMIN — GABAPENTIN 300 MG: 300 CAPSULE ORAL at 13:22

## 2018-02-03 RX ADMIN — MORPHINE SULFATE 4 MG: 2 INJECTION, SOLUTION INTRAMUSCULAR; INTRAVENOUS at 13:22

## 2018-02-03 RX ADMIN — APIXABAN 10 MG: 5 TABLET, FILM COATED ORAL at 09:31

## 2018-02-03 RX ADMIN — CLOPIDOGREL 75 MG: 75 TABLET, FILM COATED ORAL at 09:32

## 2018-02-03 RX ADMIN — KETOROLAC TROMETHAMINE 30 MG: 30 INJECTION, SOLUTION INTRAMUSCULAR at 21:42

## 2018-02-03 RX ADMIN — GABAPENTIN 300 MG: 300 CAPSULE ORAL at 21:41

## 2018-02-03 RX ADMIN — MORPHINE SULFATE 4 MG: 2 INJECTION, SOLUTION INTRAMUSCULAR; INTRAVENOUS at 19:03

## 2018-02-03 RX ADMIN — APIXABAN 10 MG: 5 TABLET, FILM COATED ORAL at 21:41

## 2018-02-03 RX ADMIN — OXYCODONE HYDROCHLORIDE AND ACETAMINOPHEN 2 TABLET: 5; 325 TABLET ORAL at 12:47

## 2018-02-03 RX ADMIN — KETOROLAC TROMETHAMINE 30 MG: 30 INJECTION, SOLUTION INTRAMUSCULAR at 15:04

## 2018-02-03 RX ADMIN — KETOROLAC TROMETHAMINE 30 MG: 30 INJECTION, SOLUTION INTRAMUSCULAR at 09:34

## 2018-02-03 RX ADMIN — MORPHINE SULFATE 2 MG: 2 INJECTION, SOLUTION INTRAMUSCULAR; INTRAVENOUS at 09:35

## 2018-02-03 RX ADMIN — CIPROFLOXACIN 500 MG: 500 TABLET, FILM COATED ORAL at 21:41

## 2018-02-03 RX ADMIN — SERTRALINE 25 MG: 25 TABLET, FILM COATED ORAL at 21:41

## 2018-02-03 RX ADMIN — GABAPENTIN 300 MG: 300 CAPSULE ORAL at 09:32

## 2018-02-03 RX ADMIN — Medication 10 ML: at 21:42

## 2018-02-03 RX ADMIN — VANCOMYCIN HYDROCHLORIDE 750 MG: 1 INJECTION, POWDER, LYOPHILIZED, FOR SOLUTION INTRAVENOUS at 01:44

## 2018-02-03 RX ADMIN — Medication 10 ML: at 09:34

## 2018-02-03 RX ADMIN — NIFEDIPINE 30 MG: 30 TABLET, FILM COATED, EXTENDED RELEASE ORAL at 09:33

## 2018-02-03 RX ADMIN — MORPHINE SULFATE 4 MG: 2 INJECTION, SOLUTION INTRAMUSCULAR; INTRAVENOUS at 07:28

## 2018-02-03 RX ADMIN — OXYCODONE HYDROCHLORIDE AND ACETAMINOPHEN 2 TABLET: 5; 325 TABLET ORAL at 08:18

## 2018-02-03 ASSESSMENT — PAIN SCALES - GENERAL
PAINLEVEL_OUTOF10: 3
PAINLEVEL_OUTOF10: 2
PAINLEVEL_OUTOF10: 7
PAINLEVEL_OUTOF10: 5
PAINLEVEL_OUTOF10: 4
PAINLEVEL_OUTOF10: 5
PAINLEVEL_OUTOF10: 3
PAINLEVEL_OUTOF10: 5
PAINLEVEL_OUTOF10: 8
PAINLEVEL_OUTOF10: 8
PAINLEVEL_OUTOF10: 5
PAINLEVEL_OUTOF10: 2
PAINLEVEL_OUTOF10: 7
PAINLEVEL_OUTOF10: 10

## 2018-02-03 ASSESSMENT — ENCOUNTER SYMPTOMS
EYE REDNESS: 1
ALLERGIC/IMMUNOLOGIC NEGATIVE: 1
RESPIRATORY NEGATIVE: 1
GASTROINTESTINAL NEGATIVE: 1

## 2018-02-03 NOTE — PROGRESS NOTES
[facility] - Initial Consult Note  Today's Date and Time: 2/3/2018, 9:53 AM  admission date 1/29/2018  Impression :   Current:    · Osteomyelitis and gangrene of left great toe - Xray shows the osteo of the hallux  · psorias of the skin - off therapy  · Rash left foot - psoriatic,   · Possible left foot dorsal cellulitis  · Small arterial disease, smoker, possible Buerger's disease     Other:  ·    Discussion    · Pt with history of foot trauma presents to the hospital with necrosis of left great toe  · Vascular workup poor small vessel supply;    · X-ray of left foot showing suspected  osteomyelitis / gangrene of the distal phalanx of left great toe   · Left foot red mostly from the psoriasis  · Begin eliquis  And Plavix per vascular- no surgical intervention, May need MRI if concern for worsening osteomyelitis. · coagulation work up- negative   F/u vancomycin trough  Recommendations   · On vanco for now -adjust dose for trough 14-17- targetting the OM of the halux - (doubt cellulitis)  · Disc w vasc in details, planning on medical anticoag and calcium channel blockers for buerger disease, trying to defer bypass for now if possible  · Disc w vasc, A bone biopsy from the OM bone of the tip, would help for cx but might open a wound that might not heal - will defer it and treat enpirically  · vasc trying to spare the toe if feasible  · disch planning on zyvox and cipro x 3 weeks, will likely renew the course once seen the office and according to interval changes.   · FU office in 2 weeks office [phone]  · smoking counselling - wants to stop  · See her PCP outpt to ck cholesterol and possibly address it w statins if needed, for better arterial disease control  · Disc x w pt and mom     Infection Control Recommendations   · [place] Precautions  Antimicrobial Stewardship Recommendations   · Simplification of therapy  Chief complaint/reason for consultation:   Left foot pain; vancomycin  750 mg Intravenous Q12H    clobetasol   Topical BID    gabapentin  300 mg Oral TID    sertraline  25 mg Oral Nightly    sodium chloride flush  10 mL Intravenous 2 times per day       Social History:     Social History     Social History    Marital status: Single     Spouse name: N/A    Number of children: N/A    Years of education: N/A     Occupational History    Not on file. Social History Main Topics    Smoking status: Current Every Day Smoker     Packs/day: 1.00     Years: 15.00    Smokeless tobacco: Never Used    Alcohol use No    Drug use: Yes     Types: Marijuana    Sexual activity: Yes     Partners: Male     Other Topics Concern    Not on file     Social History Narrative    No narrative on file       Family History:     Family History   Problem Relation Age of Onset    Cancer Mother     Diabetes Maternal Grandmother     Stroke Maternal Grandmother         Allergies:   Penicillins     Review of Systems:     Review of Systems   Constitutional: Negative. HENT: Negative. Eyes: Positive for redness. Redness resolved    Respiratory: Negative. Cardiovascular: Negative. Gastrointestinal: Negative. Had nausea but controlled at present    Endocrine: Negative. Negative for polyphagia. Genitourinary: Negative. Musculoskeletal: Positive for arthralgias. Left foot redness and toe pain- improved    Skin: Negative for rash. Positive for psoriatic rash    Allergic/Immunologic: Negative. Neurological: Negative for weakness and headaches. Hematological: Negative. Psychiatric/Behavioral: The patient is nervous/anxious. Physical Examination :     No data found. Physical Exam   Constitutional: She is oriented to person, place, and time. No distress. HENT:   Head: Normocephalic and atraumatic. Eyes: Conjunctivae are normal.   Neck: Normal range of motion. No JVD present. No tracheal deviation present.    Cardiovascular: Normal

## 2018-02-03 NOTE — PROGRESS NOTES
nondistended. Extremity:  Multiple scaling plaques consistent with pt h/o psoriasis noted to b/l LE and UE.   - Right DP/PT palpable. Patchy areas of scaling and erythema (psoriasis)  - Left DP/PT non-palpable, DP/PT doppler signal. Left hallux toenail absent with overlying hyperkeratotic/necrotic tissue, tender to palpation. No drainage or malodor noted. Dorsum of left foot +edema/erythema extending to level of ankle, mild TTP. No crepitus or induration noted. IMAGING   Summary  Left ventricle is normal in size Global left ventricular systolic function  is normal Estimated ejection fraction is 55 % . Left atrium is normal in size. Normal aortic valve structure. Trivial mitral regurgitation. Trivial tricuspid regurgitation. No pericardial effusion seen. ASSESSMENT   Active Problems:    Gangrene of toe of left foot (HCC)    Cellulitis of left foot    31 yo female with   1. chronic non-healing wound to L hallux  2. Osteomyelitis of L hallux   3. Psoriasis  4. H/o tobacco abuse  5. Buerger's disease      PLAN  1. Continue medical mgmt and supportive care per primary  2. Smoking cessation counseling - emphasized importance to patient   3. Continue nifedipine   4. AC:  eliquis 10 mg, plavix 75 mg daily  5. Continue vascular checks with vitals   6. ID following:  Continue vanco.    7. Podiatry following for wound mgmt    Vascular Surgery Resident Statement/Note:  I have discussed the case, including pertinent history and exam findings with the above medical student have personally seen the patient. Pt seen and examined at bedside. I performed both history and physical exam.     Buerger's disease is a diagnosis based on clinical exam and exclusion of other autoimmune/hypercoag and embolic sources.    -Pt h/o heavy smoking and EtOH use combined w/ young age and severe PAD consistent w/ clinical dx. Additional AI/hypercoag w/u complete and negative. ECHO w/o cardiac embolic source.      Discussed w/ ID.  Would not perform bx or debridement at this time due to high risk for worsening/progression of nonhealing wounds w/ further intervention. CCB and AC started 2/1 w/ noted improvement in pulse exam.  Would continue w/ medical mgmt and monitor toe closely for improvement vs decline. If strong concern for ongoing/worsening osteo would consider MRI. Pt's pain improved this am.  Will continue w/ medical mgmt and pain control. DC planning for home when able. Note was edited and changes made by me. Assessment and plan reviewed and changes made by me. I agree with the assessment and plan as stated above.     Electronically signed by Leticia Bhakta DO on 2/3/2018 at 7:11 AM

## 2018-02-03 NOTE — OP NOTE
was brought to  the angio suite and placed supine. The left and right groins were cleaned  and prepped in the usual fashion, and sterile drapes were applied. Micropuncture needle was used to cannulate the right common femoral artery  under ultrasound guidance. Using Seldinger technique, a 5-Polish sheath  was placed, and an Omni Flush catheter was advanced over the guidewire into  the abdominal aorta. An aortogram with pelvic runoff was obtained. The  Omni Flush catheter was used to cross the aortic bifurcation, and the  catheter was advanced through the left external iliac artery. Left lower  extremity runoff was taken, and subsequently the catheter was advanced into  the left SFA. Findings were as above, and no intervention was pursued. The catheter was removed over a guidewire. Via the right femoral sheath,  right lower extremity runoff was likewise obtained. The arteriotomy was  closed with a Mynx closure device. The procedure was tolerated well  without complications.         Cailin Webster    D: 02/02/2018 7:46:05       T: 02/02/2018 12:54:30     [email or web address]  Job#: [id number]     Doc#: [id number]    CC:

## 2018-02-04 ENCOUNTER — APPOINTMENT (OUTPATIENT)
Dept: GENERAL RADIOLOGY | Age: 38
DRG: 181 | End: 2018-02-04
Payer: COMMERCIAL

## 2018-02-04 LAB — BLOOD BANK SPECIMEN: NORMAL

## 2018-02-04 PROCEDURE — 99255 IP/OBS CONSLTJ NEW/EST HI 80: CPT | Performed by: SURGERY

## 2018-02-04 PROCEDURE — 6370000000 HC RX 637 (ALT 250 FOR IP): Performed by: PODIATRIST

## 2018-02-04 PROCEDURE — 36415 COLL VENOUS BLD VENIPUNCTURE: CPT

## 2018-02-04 PROCEDURE — 6360000002 HC RX W HCPCS: Performed by: STUDENT IN AN ORGANIZED HEALTH CARE EDUCATION/TRAINING PROGRAM

## 2018-02-04 PROCEDURE — 6370000000 HC RX 637 (ALT 250 FOR IP): Performed by: SURGERY

## 2018-02-04 PROCEDURE — 71045 X-RAY EXAM CHEST 1 VIEW: CPT

## 2018-02-04 PROCEDURE — 6370000000 HC RX 637 (ALT 250 FOR IP): Performed by: STUDENT IN AN ORGANIZED HEALTH CARE EDUCATION/TRAINING PROGRAM

## 2018-02-04 PROCEDURE — 6370000000 HC RX 637 (ALT 250 FOR IP): Performed by: INTERNAL MEDICINE

## 2018-02-04 PROCEDURE — 99232 SBSQ HOSP IP/OBS MODERATE 35: CPT | Performed by: INTERNAL MEDICINE

## 2018-02-04 PROCEDURE — 86850 RBC ANTIBODY SCREEN: CPT

## 2018-02-04 PROCEDURE — 86901 BLOOD TYPING SEROLOGIC RH(D): CPT

## 2018-02-04 PROCEDURE — 1200000000 HC SEMI PRIVATE

## 2018-02-04 PROCEDURE — 86920 COMPATIBILITY TEST SPIN: CPT

## 2018-02-04 PROCEDURE — 2580000003 HC RX 258: Performed by: STUDENT IN AN ORGANIZED HEALTH CARE EDUCATION/TRAINING PROGRAM

## 2018-02-04 PROCEDURE — 86900 BLOOD TYPING SEROLOGIC ABO: CPT

## 2018-02-04 RX ORDER — OXYCODONE HYDROCHLORIDE AND ACETAMINOPHEN 5; 325 MG/1; MG/1
1 TABLET ORAL EVERY 6 HOURS PRN
Qty: 28 TABLET | Refills: 0 | Status: SHIPPED | OUTPATIENT
Start: 2018-02-04 | End: 2018-02-11

## 2018-02-04 RX ORDER — DIPHENHYDRAMINE HCL 25 MG
25 TABLET ORAL ONCE
Status: COMPLETED | OUTPATIENT
Start: 2018-02-04 | End: 2018-02-04

## 2018-02-04 RX ORDER — CLOPIDOGREL BISULFATE 75 MG/1
75 TABLET ORAL DAILY
Qty: 30 TABLET | Refills: 0 | Status: SHIPPED | OUTPATIENT
Start: 2018-02-05 | End: 2018-02-26 | Stop reason: ALTCHOICE

## 2018-02-04 RX ORDER — MULTIVITAMIN WITH FOLIC ACID 400 MCG
1 TABLET ORAL DAILY
Status: DISCONTINUED | OUTPATIENT
Start: 2018-02-04 | End: 2018-02-09 | Stop reason: HOSPADM

## 2018-02-04 RX ORDER — NIFEDIPINE 30 MG/1
30 TABLET, FILM COATED, EXTENDED RELEASE ORAL DAILY
Qty: 30 TABLET | Refills: 0 | Status: SHIPPED | OUTPATIENT
Start: 2018-02-05 | End: 2018-02-09 | Stop reason: HOSPADM

## 2018-02-04 RX ORDER — METRONIDAZOLE 500 MG/1
500 TABLET ORAL EVERY 8 HOURS SCHEDULED
Status: DISCONTINUED | OUTPATIENT
Start: 2018-02-04 | End: 2018-02-06

## 2018-02-04 RX ADMIN — APIXABAN 10 MG: 5 TABLET, FILM COATED ORAL at 08:34

## 2018-02-04 RX ADMIN — SERTRALINE 25 MG: 25 TABLET, FILM COATED ORAL at 21:55

## 2018-02-04 RX ADMIN — OXYCODONE HYDROCHLORIDE AND ACETAMINOPHEN 2 TABLET: 5; 325 TABLET ORAL at 14:53

## 2018-02-04 RX ADMIN — DIPHENHYDRAMINE HCL 25 MG: 25 TABLET ORAL at 17:44

## 2018-02-04 RX ADMIN — NIFEDIPINE 30 MG: 30 TABLET, FILM COATED, EXTENDED RELEASE ORAL at 08:36

## 2018-02-04 RX ADMIN — MORPHINE SULFATE 4 MG: 2 INJECTION, SOLUTION INTRAMUSCULAR; INTRAVENOUS at 08:29

## 2018-02-04 RX ADMIN — MORPHINE SULFATE 4 MG: 2 INJECTION, SOLUTION INTRAMUSCULAR; INTRAVENOUS at 22:41

## 2018-02-04 RX ADMIN — LINEZOLID 600 MG: 600 TABLET, FILM COATED ORAL at 09:00

## 2018-02-04 RX ADMIN — KETOROLAC TROMETHAMINE 30 MG: 30 INJECTION, SOLUTION INTRAMUSCULAR at 08:36

## 2018-02-04 RX ADMIN — CLOPIDOGREL 75 MG: 75 TABLET, FILM COATED ORAL at 08:35

## 2018-02-04 RX ADMIN — GABAPENTIN 300 MG: 300 CAPSULE ORAL at 14:52

## 2018-02-04 RX ADMIN — OXYCODONE HYDROCHLORIDE AND ACETAMINOPHEN 2 TABLET: 5; 325 TABLET ORAL at 18:59

## 2018-02-04 RX ADMIN — CIPROFLOXACIN 500 MG: 500 TABLET, FILM COATED ORAL at 21:58

## 2018-02-04 RX ADMIN — CIPROFLOXACIN 500 MG: 500 TABLET, FILM COATED ORAL at 08:35

## 2018-02-04 RX ADMIN — OXYCODONE HYDROCHLORIDE AND ACETAMINOPHEN 2 TABLET: 5; 325 TABLET ORAL at 00:31

## 2018-02-04 RX ADMIN — LINEZOLID 600 MG: 600 TABLET, FILM COATED ORAL at 22:02

## 2018-02-04 RX ADMIN — GABAPENTIN 300 MG: 300 CAPSULE ORAL at 08:35

## 2018-02-04 RX ADMIN — KETOROLAC TROMETHAMINE 30 MG: 30 INJECTION, SOLUTION INTRAMUSCULAR at 14:52

## 2018-02-04 RX ADMIN — OXYCODONE HYDROCHLORIDE AND ACETAMINOPHEN 2 TABLET: 5; 325 TABLET ORAL at 09:21

## 2018-02-04 RX ADMIN — KETOROLAC TROMETHAMINE 30 MG: 30 INJECTION, SOLUTION INTRAMUSCULAR at 03:43

## 2018-02-04 RX ADMIN — MORPHINE SULFATE 2 MG: 2 INJECTION, SOLUTION INTRAMUSCULAR; INTRAVENOUS at 02:02

## 2018-02-04 RX ADMIN — KETOROLAC TROMETHAMINE 30 MG: 30 INJECTION, SOLUTION INTRAMUSCULAR at 22:02

## 2018-02-04 RX ADMIN — MORPHINE SULFATE 2 MG: 2 INJECTION, SOLUTION INTRAMUSCULAR; INTRAVENOUS at 12:43

## 2018-02-04 RX ADMIN — METRONIDAZOLE 500 MG: 500 TABLET, FILM COATED ORAL at 22:04

## 2018-02-04 RX ADMIN — Medication 10 ML: at 22:03

## 2018-02-04 RX ADMIN — DIPHENHYDRAMINE HCL 25 MG: 25 TABLET ORAL at 02:55

## 2018-02-04 RX ADMIN — THERA TABS 1 TABLET: TAB at 22:41

## 2018-02-04 RX ADMIN — OXYCODONE HYDROCHLORIDE AND ACETAMINOPHEN 2 TABLET: 5; 325 TABLET ORAL at 23:43

## 2018-02-04 ASSESSMENT — PAIN SCALES - GENERAL
PAINLEVEL_OUTOF10: 7
PAINLEVEL_OUTOF10: 7
PAINLEVEL_OUTOF10: 5
PAINLEVEL_OUTOF10: 8
PAINLEVEL_OUTOF10: 7
PAINLEVEL_OUTOF10: 5
PAINLEVEL_OUTOF10: 5
PAINLEVEL_OUTOF10: 7
PAINLEVEL_OUTOF10: 7
PAINLEVEL_OUTOF10: 5
PAINLEVEL_OUTOF10: 7
PAINLEVEL_OUTOF10: 3
PAINLEVEL_OUTOF10: 8
PAINLEVEL_OUTOF10: 7
PAINLEVEL_OUTOF10: 5
PAINLEVEL_OUTOF10: 10
PAINLEVEL_OUTOF10: 5

## 2018-02-04 ASSESSMENT — ENCOUNTER SYMPTOMS
COUGH: 0
ABDOMINAL DISTENTION: 0
APNEA: 0
NAUSEA: 0
ALLERGIC/IMMUNOLOGIC NEGATIVE: 1
EYE REDNESS: 0
COLOR CHANGE: 0
FACIAL SWELLING: 0
CONSTIPATION: 0
CHOKING: 0
SHORTNESS OF BREATH: 0
DIARRHEA: 0

## 2018-02-04 ASSESSMENT — PAIN DESCRIPTION - LOCATION: LOCATION: FOOT

## 2018-02-04 ASSESSMENT — PAIN DESCRIPTION - ONSET: ONSET: ON-GOING

## 2018-02-04 ASSESSMENT — PAIN DESCRIPTION - DESCRIPTORS: DESCRIPTORS: CONSTANT

## 2018-02-04 ASSESSMENT — PAIN DESCRIPTION - PROGRESSION: CLINICAL_PROGRESSION: NOT CHANGED

## 2018-02-04 ASSESSMENT — PAIN DESCRIPTION - ORIENTATION: ORIENTATION: LEFT

## 2018-02-04 ASSESSMENT — PAIN DESCRIPTION - FREQUENCY: FREQUENCY: CONTINUOUS

## 2018-02-04 ASSESSMENT — PAIN DESCRIPTION - PAIN TYPE: TYPE: CHRONIC PAIN

## 2018-02-04 NOTE — CONSULTS
(ZOLOFT) 25 MG tablet Take 1 tablet by mouth daily. 3/6/14   Bipin Yoo MD        Allergies:       Penicillins    Social History:     Tobacco:    reports that she has been smoking. She has a 15.00 pack-year smoking history. She has never used smokeless tobacco.  Alcohol:      reports that she does not drink alcohol. Drug Use:  reports that she uses drugs, including Marijuana. Family History:     Family History   Problem Relation Age of Onset    Cancer Mother     Diabetes Maternal Grandmother     Stroke Maternal Grandmother        Review of Systems:     Positive and Negative as described in HPI      Constitutional:  negative for  fevers, chills, sweats, fatigue, and weight loss  HEENT:  negative for vision or hearing changes,   Respiratory:  negative for shortness of breath, cough, or congestion  Cardiovascular:  negative for  chest pain, palpitations  Gastrointestinal:  negative for nausea, vomiting, diarrhea, constipation, abdominal pain  Genitourinary:  negative for frequency, dysuria  Integument:  +rash on her back ,+skin lesions throughout her upper and lower extremities  Chest/Breast:  No painful inspiration or expiration, no rib sternal pain  Musculoskeletal:  negative for muscle aches or joint pain now ar trest, C/O of calf pain when walking  Neurological:  negative for headaches, dizziness, lightheadedness, +numbness and pain and tingling of the left foot.   Lymphatics: no lymphadenopathy or painful masses  Behavior/Psych:  negative for depression and anxiety    Physical Exam:     Vitals:  BP (!) 95/54   Pulse 88   Temp 98.1 °F (36.7 °C) (Oral)   Resp 11   Ht 4' 11\" (1.499 m)   Wt 108 lb (49 kg)   SpO2 98%   BMI 21.81 kg/m²       General appearance - alert, sick appearing and in mild distress  Mental status - oriented to person, place and time with normal affect  Head - normocephalic and atraumatic  Eyes - pupils equal and reactive, extraocular eye movements intact, conjunctiva clear  Ears - hearing appears to be intact  Nose - no drainage noted  Mouth - mucous membranes moist  Neck - supple, no carotid bruits, thyroid not palpable, no JVD  Chest - clear to auscultation, normal effort  Heart - normal rate, regular rhythm, no murmurs  Abdomen - soft, non-tender, non-distended, bowel sounds present all four quadrants, no masses, hepatomegaly, splenomegaly or aortic enlargement  Neurological - normal speech, no focal findings or movement disorder noted, cranial nerves II through XII grossly intact  Extremities - left foot is red, slightly edematous. Right foot is normal looking. No issues with BL upper extremities  Skin - psoriatic lesions throughout the upper and lower extremities, several tattoos on her body. Erythema which is wrist progressing based on the marker of the left foot  Foot Exam - right foot is normal with palpable dorsalis pedis signal in the posterior tibial artery  Left foot from the ankle is normal however redness is identified and tenderness along the external denser hallucis longus. The great toe has dry gangrene at the area of the nailbed and cuticle. There is no odor but clearly some cellulitic changes. No palpable pulses in the foot        R brachial 1+ L brachial 1+   R radial 1+ L radial 1+   R femoral 1+ L femoral 1+   R popliteal 1+ L popliteal 0+   R posterior tibial 0+ L posterior tibial 0+   R dorsalis pedis 2+ L dorsalis pedis 0+       Foot on admission         Foot Today:                    OPAL:     Images and report have been reviewed by me. Study below           Imaging: Foot x-ray demonstrates osteomyelitis of the left digit:      TC PO2 study performed demonstrating lack of oxygenation to the left digit. Pressures do not support the ability to heal a wound      Diagnostic angiogram was performed. Images have not been migrated to the system however we found it on the imaging unit in the Cath Lab.   Photographs of representative images have been

## 2018-02-05 ENCOUNTER — ANESTHESIA (OUTPATIENT)
Dept: OPERATING ROOM | Age: 38
DRG: 181 | End: 2018-02-05
Payer: COMMERCIAL

## 2018-02-05 ENCOUNTER — ANESTHESIA EVENT (OUTPATIENT)
Dept: OPERATING ROOM | Age: 38
DRG: 181 | End: 2018-02-05
Payer: COMMERCIAL

## 2018-02-05 VITALS — TEMPERATURE: 96.9 F | OXYGEN SATURATION: 100 % | DIASTOLIC BLOOD PRESSURE: 105 MMHG | SYSTOLIC BLOOD PRESSURE: 127 MMHG

## 2018-02-05 LAB
-: NORMAL
AMORPHOUS: NORMAL
ANTICARDIOLIPIN IGA ANTIBODY: 13.5 APU
ANTICARDIOLIPIN IGG ANTIBODY: 2.6 GPU
BACTERIA: NORMAL
BILIRUBIN URINE: NEGATIVE
BLD PROD TYP BPU: NORMAL
CARDIOLIPIN AB IGM: 14.1 MPU
CASTS UA: NORMAL /LPF (ref 0–8)
COLOR: ABNORMAL
COMMENT UA: ABNORMAL
CRYSTALS, UA: NORMAL /HPF
DISPENSE STATUS BLOOD BANK: NORMAL
EPITHELIAL CELLS UA: NORMAL /HPF (ref 0–5)
GLUCOSE URINE: NEGATIVE
KETONES, URINE: NEGATIVE
LEUKOCYTE ESTERASE, URINE: ABNORMAL
MUCUS: NORMAL
NITRITE, URINE: NEGATIVE
OTHER OBSERVATIONS UA: NORMAL
PH UA: 6.5 (ref 5–8)
PROTEIN C ANTIGEN: 88 % (ref 63–153)
PROTEIN UA: NEGATIVE
RBC UA: NORMAL /HPF (ref 0–4)
RENAL EPITHELIAL, UA: NORMAL /HPF
SPECIFIC GRAVITY UA: 1.04 (ref 1–1.03)
TRANSFUSION STATUS: NORMAL
TRICHOMONAS: NORMAL
TURBIDITY: CLEAR
UNIT DIVISION: 0
UNIT NUMBER: NORMAL
URINE HGB: NEGATIVE
UROBILINOGEN, URINE: NORMAL
WBC UA: NORMAL /HPF (ref 0–5)
YEAST: NORMAL

## 2018-02-05 PROCEDURE — 6360000002 HC RX W HCPCS: Performed by: SURGERY

## 2018-02-05 PROCEDURE — 6370000000 HC RX 637 (ALT 250 FOR IP): Performed by: STUDENT IN AN ORGANIZED HEALTH CARE EDUCATION/TRAINING PROGRAM

## 2018-02-05 PROCEDURE — 6370000000 HC RX 637 (ALT 250 FOR IP): Performed by: SURGERY

## 2018-02-05 PROCEDURE — P9037 PLATE PHERES LEUKOREDU IRRAD: HCPCS

## 2018-02-05 PROCEDURE — 6360000002 HC RX W HCPCS: Performed by: NURSE ANESTHETIST, CERTIFIED REGISTERED

## 2018-02-05 PROCEDURE — 3700000001 HC ADD 15 MINUTES (ANESTHESIA): Performed by: SURGERY

## 2018-02-05 PROCEDURE — 81001 URINALYSIS AUTO W/SCOPE: CPT

## 2018-02-05 PROCEDURE — 3600000013 HC SURGERY LEVEL 3 ADDTL 15MIN: Performed by: SURGERY

## 2018-02-05 PROCEDURE — 041L09N BYPASS LEFT FEMORAL ARTERY TO POSTERIOR TIBIAL ARTERY WITH AUTOLOGOUS VENOUS TISSUE, OPEN APPROACH: ICD-10-PCS | Performed by: SURGERY

## 2018-02-05 PROCEDURE — 3600000003 HC SURGERY LEVEL 3 BASE: Performed by: SURGERY

## 2018-02-05 PROCEDURE — 6360000002 HC RX W HCPCS: Performed by: STUDENT IN AN ORGANIZED HEALTH CARE EDUCATION/TRAINING PROGRAM

## 2018-02-05 PROCEDURE — 2500000003 HC RX 250 WO HCPCS

## 2018-02-05 PROCEDURE — 99232 SBSQ HOSP IP/OBS MODERATE 35: CPT | Performed by: INTERNAL MEDICINE

## 2018-02-05 PROCEDURE — 3700000000 HC ANESTHESIA ATTENDED CARE: Performed by: SURGERY

## 2018-02-05 PROCEDURE — 06BP0ZZ EXCISION OF RIGHT SAPHENOUS VEIN, OPEN APPROACH: ICD-10-PCS | Performed by: SURGERY

## 2018-02-05 PROCEDURE — 3600000015 HC SURGERY LEVEL 5 ADDTL 15MIN: Performed by: SURGERY

## 2018-02-05 PROCEDURE — 94770 HC ETCO2 MONITOR DAILY: CPT

## 2018-02-05 PROCEDURE — 2500000003 HC RX 250 WO HCPCS: Performed by: NURSE ANESTHETIST, CERTIFIED REGISTERED

## 2018-02-05 PROCEDURE — 3600000005 HC SURGERY LEVEL 5 BASE: Performed by: SURGERY

## 2018-02-05 PROCEDURE — C1729 CATH, DRAINAGE: HCPCS | Performed by: SURGERY

## 2018-02-05 PROCEDURE — 6370000000 HC RX 637 (ALT 250 FOR IP): Performed by: INTERNAL MEDICINE

## 2018-02-05 PROCEDURE — 2580000003 HC RX 258: Performed by: NURSE ANESTHETIST, CERTIFIED REGISTERED

## 2018-02-05 PROCEDURE — 2580000003 HC RX 258: Performed by: STUDENT IN AN ORGANIZED HEALTH CARE EDUCATION/TRAINING PROGRAM

## 2018-02-05 PROCEDURE — 7100000011 HC PHASE II RECOVERY - ADDTL 15 MIN

## 2018-02-05 PROCEDURE — 75710 ARTERY X-RAYS ARM/LEG: CPT | Performed by: SURGERY

## 2018-02-05 PROCEDURE — 2580000003 HC RX 258: Performed by: SURGERY

## 2018-02-05 PROCEDURE — C1894 INTRO/SHEATH, NON-LASER: HCPCS | Performed by: SURGERY

## 2018-02-05 PROCEDURE — 2000000000 HC ICU R&B

## 2018-02-05 PROCEDURE — 7100000000 HC PACU RECOVERY - FIRST 15 MIN

## 2018-02-05 PROCEDURE — 6360000004 HC RX CONTRAST MEDICATION: Performed by: SURGERY

## 2018-02-05 RX ORDER — CIPROFLOXACIN 2 MG/ML
400 INJECTION, SOLUTION INTRAVENOUS ONCE
Status: COMPLETED | OUTPATIENT
Start: 2018-02-05 | End: 2018-02-05

## 2018-02-05 RX ORDER — ROCURONIUM BROMIDE 10 MG/ML
INJECTION, SOLUTION INTRAVENOUS PRN
Status: DISCONTINUED | OUTPATIENT
Start: 2018-02-05 | End: 2018-02-05 | Stop reason: SDUPTHER

## 2018-02-05 RX ORDER — NITROGLYCERIN 10 MG/100ML
INJECTION INTRAVENOUS CONTINUOUS PRN
Status: DISCONTINUED | OUTPATIENT
Start: 2018-02-05 | End: 2018-02-05 | Stop reason: HOSPADM

## 2018-02-05 RX ORDER — MORPHINE SULFATE/0.9% NACL/PF 1 MG/ML
SYRINGE (ML) INJECTION CONTINUOUS
Status: DISCONTINUED | OUTPATIENT
Start: 2018-02-05 | End: 2018-02-06

## 2018-02-05 RX ORDER — NALOXONE HYDROCHLORIDE 0.4 MG/ML
0.4 INJECTION, SOLUTION INTRAMUSCULAR; INTRAVENOUS; SUBCUTANEOUS PRN
Status: DISCONTINUED | OUTPATIENT
Start: 2018-02-05 | End: 2018-02-06

## 2018-02-05 RX ORDER — DOCUSATE SODIUM 100 MG/1
100 CAPSULE, LIQUID FILLED ORAL 2 TIMES DAILY
Status: DISCONTINUED | OUTPATIENT
Start: 2018-02-05 | End: 2018-02-06

## 2018-02-05 RX ORDER — IODIXANOL 320 MG/ML
INJECTION, SOLUTION INTRAVASCULAR PRN
Status: DISCONTINUED | OUTPATIENT
Start: 2018-02-05 | End: 2018-02-05 | Stop reason: HOSPADM

## 2018-02-05 RX ORDER — HEPARIN SODIUM AND DEXTROSE 10000; 5 [USP'U]/100ML; G/100ML
500 INJECTION INTRAVENOUS CONTINUOUS
Status: DISCONTINUED | OUTPATIENT
Start: 2018-02-05 | End: 2018-02-06

## 2018-02-05 RX ORDER — ONDANSETRON 2 MG/ML
INJECTION INTRAMUSCULAR; INTRAVENOUS PRN
Status: DISCONTINUED | OUTPATIENT
Start: 2018-02-05 | End: 2018-02-05 | Stop reason: SDUPTHER

## 2018-02-05 RX ORDER — MORPHINE SULFATE 10 MG/ML
INJECTION, SOLUTION INTRAMUSCULAR; INTRAVENOUS PRN
Status: DISCONTINUED | OUTPATIENT
Start: 2018-02-05 | End: 2018-02-05 | Stop reason: SDUPTHER

## 2018-02-05 RX ORDER — PROPOFOL 10 MG/ML
INJECTION, EMULSION INTRAVENOUS PRN
Status: DISCONTINUED | OUTPATIENT
Start: 2018-02-05 | End: 2018-02-05 | Stop reason: SDUPTHER

## 2018-02-05 RX ORDER — SODIUM CHLORIDE, SODIUM LACTATE, POTASSIUM CHLORIDE, CALCIUM CHLORIDE 600; 310; 30; 20 MG/100ML; MG/100ML; MG/100ML; MG/100ML
INJECTION, SOLUTION INTRAVENOUS CONTINUOUS PRN
Status: DISCONTINUED | OUTPATIENT
Start: 2018-02-05 | End: 2018-02-05 | Stop reason: SDUPTHER

## 2018-02-05 RX ORDER — LIDOCAINE HYDROCHLORIDE 10 MG/ML
INJECTION, SOLUTION EPIDURAL; INFILTRATION; INTRACAUDAL; PERINEURAL PRN
Status: DISCONTINUED | OUTPATIENT
Start: 2018-02-05 | End: 2018-02-05 | Stop reason: SDUPTHER

## 2018-02-05 RX ORDER — FENTANYL CITRATE 50 UG/ML
INJECTION, SOLUTION INTRAMUSCULAR; INTRAVENOUS PRN
Status: DISCONTINUED | OUTPATIENT
Start: 2018-02-05 | End: 2018-02-05 | Stop reason: SDUPTHER

## 2018-02-05 RX ORDER — DEXAMETHASONE SODIUM PHOSPHATE 10 MG/ML
INJECTION INTRAMUSCULAR; INTRAVENOUS PRN
Status: DISCONTINUED | OUTPATIENT
Start: 2018-02-05 | End: 2018-02-05 | Stop reason: SDUPTHER

## 2018-02-05 RX ORDER — DIPHENHYDRAMINE HCL 25 MG
25 TABLET ORAL ONCE
Status: COMPLETED | OUTPATIENT
Start: 2018-02-05 | End: 2018-02-05

## 2018-02-05 RX ORDER — DIPHENHYDRAMINE HCL 25 MG
25 TABLET ORAL EVERY 6 HOURS PRN
Status: DISCONTINUED | OUTPATIENT
Start: 2018-02-05 | End: 2018-02-06

## 2018-02-05 RX ORDER — GLYCOPYRROLATE 0.2 MG/ML
INJECTION INTRAMUSCULAR; INTRAVENOUS PRN
Status: DISCONTINUED | OUTPATIENT
Start: 2018-02-05 | End: 2018-02-05 | Stop reason: SDUPTHER

## 2018-02-05 RX ORDER — BISACODYL 10 MG
10 SUPPOSITORY, RECTAL RECTAL DAILY PRN
Status: DISCONTINUED | OUTPATIENT
Start: 2018-02-05 | End: 2018-02-06

## 2018-02-05 RX ORDER — HEPARIN SODIUM 1000 [USP'U]/ML
INJECTION, SOLUTION INTRAVENOUS; SUBCUTANEOUS PRN
Status: DISCONTINUED | OUTPATIENT
Start: 2018-02-05 | End: 2018-02-05 | Stop reason: SDUPTHER

## 2018-02-05 RX ORDER — NEOSTIGMINE METHYLSULFATE 1 MG/ML
INJECTION, SOLUTION INTRAVENOUS PRN
Status: DISCONTINUED | OUTPATIENT
Start: 2018-02-05 | End: 2018-02-05 | Stop reason: SDUPTHER

## 2018-02-05 RX ORDER — MIDAZOLAM HYDROCHLORIDE 1 MG/ML
INJECTION INTRAMUSCULAR; INTRAVENOUS PRN
Status: DISCONTINUED | OUTPATIENT
Start: 2018-02-05 | End: 2018-02-05 | Stop reason: SDUPTHER

## 2018-02-05 RX ADMIN — MORPHINE SULFATE 10 MG: 10 INJECTION INTRAVENOUS at 16:48

## 2018-02-05 RX ADMIN — CIPROFLOXACIN 400 MG: 2 INJECTION, SOLUTION INTRAVENOUS at 12:20

## 2018-02-05 RX ADMIN — PHENYLEPHRINE HYDROCHLORIDE 100 MCG: 10 INJECTION INTRAVENOUS at 13:55

## 2018-02-05 RX ADMIN — FENTANYL CITRATE 100 MCG: 50 INJECTION INTRAMUSCULAR; INTRAVENOUS at 15:13

## 2018-02-05 RX ADMIN — METRONIDAZOLE 500 MG: 500 TABLET, FILM COATED ORAL at 21:29

## 2018-02-05 RX ADMIN — SODIUM CHLORIDE, POTASSIUM CHLORIDE, SODIUM LACTATE AND CALCIUM CHLORIDE: 600; 310; 30; 20 INJECTION, SOLUTION INTRAVENOUS at 12:01

## 2018-02-05 RX ADMIN — LINEZOLID 600 MG: 600 TABLET, FILM COATED ORAL at 21:09

## 2018-02-05 RX ADMIN — PHENYLEPHRINE HYDROCHLORIDE 100 MCG: 10 INJECTION INTRAVENOUS at 12:11

## 2018-02-05 RX ADMIN — MORPHINE SULFATE 30 MG: 1 INJECTION INTRAVENOUS at 19:01

## 2018-02-05 RX ADMIN — CIPROFLOXACIN 500 MG: 500 TABLET, FILM COATED ORAL at 21:09

## 2018-02-05 RX ADMIN — ROCURONIUM BROMIDE 30 MG: 10 INJECTION INTRAVENOUS at 15:10

## 2018-02-05 RX ADMIN — PHENYLEPHRINE HYDROCHLORIDE 100 MCG: 10 INJECTION INTRAVENOUS at 13:30

## 2018-02-05 RX ADMIN — HEPARIN SODIUM AND DEXTROSE 500 UNITS/HR: 10000; 5 INJECTION INTRAVENOUS at 19:16

## 2018-02-05 RX ADMIN — ONDANSETRON 4 MG: 2 INJECTION, SOLUTION INTRAMUSCULAR; INTRAVENOUS at 16:31

## 2018-02-05 RX ADMIN — PHENYLEPHRINE HYDROCHLORIDE 100 MCG: 10 INJECTION INTRAVENOUS at 13:44

## 2018-02-05 RX ADMIN — PHENYLEPHRINE HYDROCHLORIDE 100 MCG: 10 INJECTION INTRAVENOUS at 13:09

## 2018-02-05 RX ADMIN — FENTANYL CITRATE 50 MCG: 50 INJECTION INTRAMUSCULAR; INTRAVENOUS at 12:44

## 2018-02-05 RX ADMIN — SODIUM CHLORIDE, POTASSIUM CHLORIDE, SODIUM LACTATE AND CALCIUM CHLORIDE: 600; 310; 30; 20 INJECTION, SOLUTION INTRAVENOUS at 14:20

## 2018-02-05 RX ADMIN — DEXAMETHASONE SODIUM PHOSPHATE 10 MG: 10 INJECTION INTRAMUSCULAR; INTRAVENOUS at 12:38

## 2018-02-05 RX ADMIN — MIDAZOLAM HYDROCHLORIDE 2 MG: 1 INJECTION, SOLUTION INTRAMUSCULAR; INTRAVENOUS at 11:57

## 2018-02-05 RX ADMIN — OXYCODONE HYDROCHLORIDE AND ACETAMINOPHEN 1 TABLET: 5; 325 TABLET ORAL at 21:09

## 2018-02-05 RX ADMIN — GLYCOPYRROLATE 0.4 MG: 0.2 INJECTION INTRAMUSCULAR; INTRAVENOUS at 16:51

## 2018-02-05 RX ADMIN — PROPOFOL 150 MG: 10 INJECTION, EMULSION INTRAVENOUS at 11:59

## 2018-02-05 RX ADMIN — OXYCODONE HYDROCHLORIDE AND ACETAMINOPHEN 2 TABLET: 5; 325 TABLET ORAL at 06:08

## 2018-02-05 RX ADMIN — SODIUM CHLORIDE, POTASSIUM CHLORIDE, SODIUM LACTATE AND CALCIUM CHLORIDE: 600; 310; 30; 20 INJECTION, SOLUTION INTRAVENOUS at 11:56

## 2018-02-05 RX ADMIN — MORPHINE SULFATE 30 MG: 1 INJECTION INTRAVENOUS at 22:48

## 2018-02-05 RX ADMIN — MORPHINE SULFATE 4 MG: 2 INJECTION, SOLUTION INTRAMUSCULAR; INTRAVENOUS at 00:12

## 2018-02-05 RX ADMIN — PHENYLEPHRINE HYDROCHLORIDE 100 MCG: 10 INJECTION INTRAVENOUS at 12:56

## 2018-02-05 RX ADMIN — NEOSTIGMINE METHYLSULFATE 3 MG: 1 INJECTION INTRAVENOUS at 16:51

## 2018-02-05 RX ADMIN — HEPARIN SODIUM 5000 UNITS: 1000 INJECTION, SOLUTION INTRAVENOUS; SUBCUTANEOUS at 15:05

## 2018-02-05 RX ADMIN — FENTANYL CITRATE 100 MCG: 50 INJECTION INTRAMUSCULAR; INTRAVENOUS at 11:59

## 2018-02-05 RX ADMIN — KETOROLAC TROMETHAMINE 30 MG: 30 INJECTION, SOLUTION INTRAMUSCULAR at 02:09

## 2018-02-05 RX ADMIN — Medication 10 ML: at 21:29

## 2018-02-05 RX ADMIN — FENTANYL CITRATE 50 MCG: 50 INJECTION INTRAMUSCULAR; INTRAVENOUS at 13:15

## 2018-02-05 RX ADMIN — MORPHINE SULFATE 4 MG: 2 INJECTION, SOLUTION INTRAMUSCULAR; INTRAVENOUS at 02:09

## 2018-02-05 RX ADMIN — SODIUM CHLORIDE, POTASSIUM CHLORIDE, SODIUM LACTATE AND CALCIUM CHLORIDE: 600; 310; 30; 20 INJECTION, SOLUTION INTRAVENOUS at 16:50

## 2018-02-05 RX ADMIN — DOCUSATE SODIUM 100 MG: 100 CAPSULE ORAL at 21:09

## 2018-02-05 RX ADMIN — LIDOCAINE HYDROCHLORIDE 50 MG: 10 INJECTION, SOLUTION EPIDURAL; INFILTRATION; INTRACAUDAL; PERINEURAL at 11:59

## 2018-02-05 RX ADMIN — ROCURONIUM BROMIDE 50 MG: 10 INJECTION INTRAVENOUS at 11:59

## 2018-02-05 RX ADMIN — DIPHENHYDRAMINE HCL 25 MG: 25 TABLET ORAL at 02:02

## 2018-02-05 RX ADMIN — METRONIDAZOLE 500 MG: 500 TABLET, FILM COATED ORAL at 06:09

## 2018-02-05 ASSESSMENT — PULMONARY FUNCTION TESTS
PIF_VALUE: 2
PIF_VALUE: 3
PIF_VALUE: 1
PIF_VALUE: 18
PIF_VALUE: 19
PIF_VALUE: 17
PIF_VALUE: 17
PIF_VALUE: 18
PIF_VALUE: 8
PIF_VALUE: 17
PIF_VALUE: 18
PIF_VALUE: 17
PIF_VALUE: 20
PIF_VALUE: 18
PIF_VALUE: 17
PIF_VALUE: 19
PIF_VALUE: 1
PIF_VALUE: 18
PIF_VALUE: 11
PIF_VALUE: 1
PIF_VALUE: 19
PIF_VALUE: 19
PIF_VALUE: 20
PIF_VALUE: 16
PIF_VALUE: 19
PIF_VALUE: 18
PIF_VALUE: 19
PIF_VALUE: 19
PIF_VALUE: 18
PIF_VALUE: 20
PIF_VALUE: 18
PIF_VALUE: 17
PIF_VALUE: 18
PIF_VALUE: 16
PIF_VALUE: 19
PIF_VALUE: 17
PIF_VALUE: 19
PIF_VALUE: 16
PIF_VALUE: 18
PIF_VALUE: 9
PIF_VALUE: 19
PIF_VALUE: 19
PIF_VALUE: 18
PIF_VALUE: 19
PIF_VALUE: 18
PIF_VALUE: 19
PIF_VALUE: 17
PIF_VALUE: 18
PIF_VALUE: 17
PIF_VALUE: 20
PIF_VALUE: 19
PIF_VALUE: 19
PIF_VALUE: 17
PIF_VALUE: 19
PIF_VALUE: 18
PIF_VALUE: 19
PIF_VALUE: 19
PIF_VALUE: 17
PIF_VALUE: 17
PIF_VALUE: 18
PIF_VALUE: 17
PIF_VALUE: 17
PIF_VALUE: 19
PIF_VALUE: 18
PIF_VALUE: 16
PIF_VALUE: 19
PIF_VALUE: 18
PIF_VALUE: 18
PIF_VALUE: 19
PIF_VALUE: 18
PIF_VALUE: 18
PIF_VALUE: 20
PIF_VALUE: 19
PIF_VALUE: 18
PIF_VALUE: 18
PIF_VALUE: 17
PIF_VALUE: 16
PIF_VALUE: 4
PIF_VALUE: 19
PIF_VALUE: 17
PIF_VALUE: 18
PIF_VALUE: 19
PIF_VALUE: 18
PIF_VALUE: 19
PIF_VALUE: 18
PIF_VALUE: 20
PIF_VALUE: 18
PIF_VALUE: 16
PIF_VALUE: 16
PIF_VALUE: 19
PIF_VALUE: 21
PIF_VALUE: 16
PIF_VALUE: 18
PIF_VALUE: 19
PIF_VALUE: 18
PIF_VALUE: 18
PIF_VALUE: 17
PIF_VALUE: 19
PIF_VALUE: 18
PIF_VALUE: 18
PIF_VALUE: 27
PIF_VALUE: 19
PIF_VALUE: 18
PIF_VALUE: 17
PIF_VALUE: 18
PIF_VALUE: 17
PIF_VALUE: 18
PIF_VALUE: 5
PIF_VALUE: 18
PIF_VALUE: 16
PIF_VALUE: 18
PIF_VALUE: 16
PIF_VALUE: 18
PIF_VALUE: 3
PIF_VALUE: 19
PIF_VALUE: 17
PIF_VALUE: 18
PIF_VALUE: 19
PIF_VALUE: 17
PIF_VALUE: 17
PIF_VALUE: 19
PIF_VALUE: 18
PIF_VALUE: 19
PIF_VALUE: 20
PIF_VALUE: 17
PIF_VALUE: 16
PIF_VALUE: 18
PIF_VALUE: 17
PIF_VALUE: 19
PIF_VALUE: 16
PIF_VALUE: 19
PIF_VALUE: 19
PIF_VALUE: 18
PIF_VALUE: 17
PIF_VALUE: 18
PIF_VALUE: 18
PIF_VALUE: 19
PIF_VALUE: 19
PIF_VALUE: 18
PIF_VALUE: 17
PIF_VALUE: 18
PIF_VALUE: 17
PIF_VALUE: 19
PIF_VALUE: 18
PIF_VALUE: 17
PIF_VALUE: 17
PIF_VALUE: 18
PIF_VALUE: 17
PIF_VALUE: 20
PIF_VALUE: 19
PIF_VALUE: 17
PIF_VALUE: 18
PIF_VALUE: 17
PIF_VALUE: 18
PIF_VALUE: 19
PIF_VALUE: 17
PIF_VALUE: 17
PIF_VALUE: 18
PIF_VALUE: 17
PIF_VALUE: 18
PIF_VALUE: 17
PIF_VALUE: 16
PIF_VALUE: 19
PIF_VALUE: 17
PIF_VALUE: 1
PIF_VALUE: 19
PIF_VALUE: 17
PIF_VALUE: 17
PIF_VALUE: 9
PIF_VALUE: 4
PIF_VALUE: 17
PIF_VALUE: 18
PIF_VALUE: 19
PIF_VALUE: 17
PIF_VALUE: 19
PIF_VALUE: 17
PIF_VALUE: 16
PIF_VALUE: 17
PIF_VALUE: 10
PIF_VALUE: 18
PIF_VALUE: 16
PIF_VALUE: 16
PIF_VALUE: 18
PIF_VALUE: 19
PIF_VALUE: 19
PIF_VALUE: 18
PIF_VALUE: 18
PIF_VALUE: 17
PIF_VALUE: 18
PIF_VALUE: 18
PIF_VALUE: 19
PIF_VALUE: 17
PIF_VALUE: 18
PIF_VALUE: 19
PIF_VALUE: 19
PIF_VALUE: 18
PIF_VALUE: 18
PIF_VALUE: 28
PIF_VALUE: 19
PIF_VALUE: 18
PIF_VALUE: 19
PIF_VALUE: 17
PIF_VALUE: 3
PIF_VALUE: 18
PIF_VALUE: 19
PIF_VALUE: 17
PIF_VALUE: 18
PIF_VALUE: 19
PIF_VALUE: 18
PIF_VALUE: 19
PIF_VALUE: 18
PIF_VALUE: 19
PIF_VALUE: 18
PIF_VALUE: 17
PIF_VALUE: 18
PIF_VALUE: 18
PIF_VALUE: 16
PIF_VALUE: 3
PIF_VALUE: 18
PIF_VALUE: 18
PIF_VALUE: 2
PIF_VALUE: 19
PIF_VALUE: 18
PIF_VALUE: 17
PIF_VALUE: 18
PIF_VALUE: 19
PIF_VALUE: 18
PIF_VALUE: 18
PIF_VALUE: 20
PIF_VALUE: 16
PIF_VALUE: 6
PIF_VALUE: 29
PIF_VALUE: 19
PIF_VALUE: 16
PIF_VALUE: 18
PIF_VALUE: 17
PIF_VALUE: 18
PIF_VALUE: 17
PIF_VALUE: 18
PIF_VALUE: 17
PIF_VALUE: 19
PIF_VALUE: 18
PIF_VALUE: 3
PIF_VALUE: 18
PIF_VALUE: 18
PIF_VALUE: 10
PIF_VALUE: 19
PIF_VALUE: 19
PIF_VALUE: 18
PIF_VALUE: 19
PIF_VALUE: 18
PIF_VALUE: 17
PIF_VALUE: 17
PIF_VALUE: 18
PIF_VALUE: 20
PIF_VALUE: 19
PIF_VALUE: 16
PIF_VALUE: 19
PIF_VALUE: 18
PIF_VALUE: 19
PIF_VALUE: 21
PIF_VALUE: 18
PIF_VALUE: 18
PIF_VALUE: 20
PIF_VALUE: 19
PIF_VALUE: 16
PIF_VALUE: 19
PIF_VALUE: 17

## 2018-02-05 ASSESSMENT — LIFESTYLE VARIABLES: SMOKING_STATUS: 1

## 2018-02-05 ASSESSMENT — ENCOUNTER SYMPTOMS
NAUSEA: 0
APNEA: 0
CHOKING: 0
RESPIRATORY NEGATIVE: 1
ABDOMINAL DISTENTION: 0
EYES NEGATIVE: 1
CONSTIPATION: 0
COUGH: 0
EYE REDNESS: 0
FACIAL SWELLING: 0
COLOR CHANGE: 0
DIARRHEA: 0
SHORTNESS OF BREATH: 0
ALLERGIC/IMMUNOLOGIC NEGATIVE: 1

## 2018-02-05 ASSESSMENT — PAIN SCALES - GENERAL
PAINLEVEL_OUTOF10: 9
PAINLEVEL_OUTOF10: 7
PAINLEVEL_OUTOF10: 10
PAINLEVEL_OUTOF10: 7
PAINLEVEL_OUTOF10: 10
PAINLEVEL_OUTOF10: 7

## 2018-02-05 ASSESSMENT — PAIN DESCRIPTION - LOCATION: LOCATION: FOOT

## 2018-02-05 ASSESSMENT — PAIN DESCRIPTION - ORIENTATION: ORIENTATION: LEFT;RIGHT

## 2018-02-05 ASSESSMENT — PAIN DESCRIPTION - PAIN TYPE: TYPE: SURGICAL PAIN

## 2018-02-05 NOTE — PLAN OF CARE
Problem: Nutrition  Goal: Optimal nutrition therapy  Outcome: Ongoing  Nutrition Problem: Inadequate oral intake  Intervention: Food and/or Nutrient Delivery: Start oral diet, Start ONS  Nutritional Goals: Intake greater than 50%

## 2018-02-05 NOTE — ANESTHESIA PRE PROCEDURE
Department of Anesthesiology  Preprocedure Note       Name:  Deisy Lundberg   Age:  30 y.o.  :  1980                                          MRN:  [id number]         Date:  2018      Surgeon: Luna Ag):  Flaquita Green MD    Procedure: Procedure(s):  femoral distal bypass    Medications prior to admission:   Prior to Admission medications    Medication Sig Start Date End Date Taking? Authorizing Provider   oxyCODONE-acetaminophen (PERCOCET) 5-325 MG per tablet Take 1 tablet by mouth every 6 hours as needed for Pain for up to 7 days. 18 Yes William Ocampo DPM   NIFEdipine (ADALAT CC) 30 MG extended release tablet Take 1 tablet by mouth daily 18  Yes Basilio Morales DPM   clopidogrel (PLAVIX) 75 MG tablet Take 1 tablet by mouth daily 18  Yes Basilio Morales DPM   apixaban (ELIQUIS) 5 MG TABS tablet Take 2 tablets by mouth 2 times daily for 4 days 18 Yes Basilio Morales DPM   apixaban (ELIQUIS) 5 MG TABS tablet Take 1 tablet by mouth 2 times daily 18  Yes Basilio Morales DPM   gabapentin (NEURONTIN) 300 MG capsule Take 300 mg by mouth 3 times daily. Yes Historical Provider, MD   ibuprofen (ADVIL;MOTRIN) 800 MG tablet Take 800 mg by mouth every 6 hours as needed for Pain   Yes Historical Provider, MD   clobetasol (TEMOVATE) 0.05 % ointment Apply topically 2 times daily. 3/6/14   Nikita Schmidt MD   sertraline (ZOLOFT) 25 MG tablet Take 1 tablet by mouth daily.  3/6/14   Nikita Schmidt MD       Current medications:    Current Facility-Administered Medications   Medication Dose Route Frequency Provider Last Rate Last Dose    diphenhydrAMINE (BENADRYL) tablet 25 mg  25 mg Oral Q6H PRN William Ocampo DPM        multivitamin 1 tablet  1 tablet Oral Daily Flaquita Green MD   1 tablet at 18 2241    metroNIDAZOLE (FLAGYL) tablet 500 mg  500 mg Oral 3 times per day Philip Martins MD   500 mg at 18 0609    linezolid (ZYVOX) tablet 600 mg  600 mg Oral 2 times per

## 2018-02-05 NOTE — PROGRESS NOTES
Nutrition Assessment    Type and Reason for Visit: Initial    Nutrition Recommendations: Recommend General diet when advanced back to solid food and adding Magic Cup to all trays. Malnutrition Assessment:  · Malnutrition Status: No malnutrition    Nutrition Diagnosis:   · Problem: Inadequate oral intake  · Etiology: related to  (decreased appetite)     Signs and symptoms:  as evidenced by Intake 0-25%    Nutrition Assessment:  · Subjective Assessment: Pt seen due to length of stay. Pt was very sleepy, ready for surgery later today. Mom states she is drinking well, taking less solid food than her normal.   · Wound Type: Surgical Wound (black toe)  · Current Nutrition Therapies:  · Oral Diet Orders: NPO   · Oral Diet intake: 1-25%  · Anthropometric Measures:  · Ht: 4' 11\" (149.9 cm)   · Current Body Wt: 108 lb (49 kg)  · Ideal Body Wt: 95 lb (43.1 kg), % Ideal Body 114%  · BMI Classification: BMI 18.5 - 24.9 Normal Weight  · Comparative Standards (Estimated Nutrition Needs):  · Estimated Daily Total Kcal: 7101-3355 kcal  · Estimated Daily Protein (g): 60-70 g    Estimated Intake vs Estimated Needs: Intake Less Than Needs    Nutrition Risk Level: Moderate    Nutrition Interventions:   Start oral diet, Start ONS       Nutrition Evaluation:   · Evaluation: Goals set   · Goals: Intake greater than 50%    · Monitoring: Meal Intake, Supplement Intake, Wound Healing    See Adult Nutrition Doc Flowsheet for more detail.      Electronically signed by Cesar Treviño RD, HAYES on 2/5/18 at 3:56 PM    Contact Number: [phone]

## 2018-02-05 NOTE — PROGRESS NOTES
appetite change, chills and fever. HENT: Negative for congestion, ear discharge, ear pain, facial swelling and nosebleeds. Eyes: Negative. Negative for redness. Redness resolved    Respiratory: Negative. Negative for apnea, cough, choking and shortness of breath. Cardiovascular: Negative for chest pain and palpitations. Gastrointestinal: Negative for abdominal distention, constipation, diarrhea and nausea. Had nausea but controlled at present    Endocrine: Negative. Negative for heat intolerance, polydipsia and polyphagia. Genitourinary: Negative for difficulty urinating, dysuria and flank pain. Musculoskeletal:        Left foot redness and toe pain- improving, but present   Skin: Positive for rash. Negative for color change. Positive for psoriatic rash   Itching w new rash    Allergic/Immunologic: Negative. Neurological: Positive for numbness. Negative for dizziness, weakness and headaches. Hematological: Negative. Psychiatric/Behavioral: Negative for agitation. The patient is not nervous/anxious. Physical Examination :     Patient Vitals for the past 8 hrs:   BP Temp Temp src Pulse Resp SpO2   02/05/18 0909 (!) 93/49 97.5 °F (36.4 °C) Oral 72 18 96 %       Physical Exam   Constitutional: She is oriented to person, place, and time and well-developed, well-nourished, and in no distress. No distress. HENT:   Head: Normocephalic and atraumatic. Eyes: Conjunctivae and EOM are normal. Right eye exhibits no discharge. Left eye exhibits no discharge. Neck: Normal range of motion. No tracheal deviation present. Cardiovascular: Normal rate and regular rhythm. Exam reveals no gallop and no friction rub. Pulmonary/Chest: Effort normal and breath sounds normal. She has no rales. Abdominal: Soft. Bowel sounds are normal. She exhibits no distension. Musculoskeletal: Normal range of motion. She exhibits no edema.    Left hallux gangrene, dry  Left foot

## 2018-02-05 NOTE — FLOWSHEET NOTE
Visit: Ronal Francois was requested by family to fill out an advance directive before patient went into surgery. Patient and her mother were present in room. They were packing up patient's belongings because she was going to go into surgery and move to another room. Assessment: Both patient and mother were anxious about the surgery, as well as concerned about who would be allowed to make medical decisions for the patient, if it came to that. The patient states that her boyfriend wants to be involved but \"does not always make good decisions. \"    Intervention:  explained that because the patient was not  to her significant other, her mother would legally be the decision maker. Patient decided it was not necessary to fill out the HPOA immediately.  explained that if she wants to fill it out at a later time while she is here, she may request a  to do so. She stated that she would call when her boyfriend is not present.  offered to pray with patient. Patient accepted prayer and stated that she does not follow any specific Jain but does believe in God.  prayed with patient and mother. Plan: Chaplains to remain available to help patient complete the advance directive, and to provide further support as needed. 02/05/18 0549   Encounter Summary   Services provided to: Patient and family together   Referral/Consult From: Family   Support System Parent   Continue Visiting (2/5/18)   Complexity of Encounter Moderate   Length of Encounter 30 minutes   Spiritual Assessment Completed Yes   Routine   Type Initial   Assessment Approachable; Anxious   Intervention Active listening;Explored feelings, thoughts, concerns;Nurtured hope;Prayer   Outcome Expressed gratitude;Engaged in conversation; Concerns relieved;Expressed feelings/needs/concerns;Receptive

## 2018-02-06 LAB
ABO/RH: NORMAL
ANION GAP SERPL CALCULATED.3IONS-SCNC: 11 MMOL/L (ref 9–17)
ANTIBODY SCREEN: NEGATIVE
ARM BAND NUMBER: NORMAL
BLD PROD TYP BPU: NORMAL
BLD PROD TYP BPU: NORMAL
BUN BLDV-MCNC: 9 MG/DL (ref 6–20)
BUN/CREAT BLD: ABNORMAL (ref 9–20)
CALCIUM SERPL-MCNC: 8.3 MG/DL (ref 8.6–10.4)
CHLORIDE BLD-SCNC: 99 MMOL/L (ref 98–107)
CO2: 28 MMOL/L (ref 20–31)
CREAT SERPL-MCNC: 0.32 MG/DL (ref 0.5–0.9)
CROSSMATCH RESULT: NORMAL
DISPENSE STATUS BLOOD BANK: NORMAL
DISPENSE STATUS BLOOD BANK: NORMAL
EXPIRATION DATE: NORMAL
GFR AFRICAN AMERICAN: >60 ML/MIN
GFR NON-AFRICAN AMERICAN: >60 ML/MIN
GFR SERPL CREATININE-BSD FRML MDRD: ABNORMAL ML/MIN/{1.73_M2}
GFR SERPL CREATININE-BSD FRML MDRD: ABNORMAL ML/MIN/{1.73_M2}
GLUCOSE BLD-MCNC: 91 MG/DL (ref 70–99)
HCT VFR BLD CALC: 30.2 % (ref 36.3–47.1)
HEMOGLOBIN: 9.7 G/DL (ref 11.9–15.1)
MCH RBC QN AUTO: 31.2 PG (ref 25.2–33.5)
MCHC RBC AUTO-ENTMCNC: 32.1 G/DL (ref 28.4–34.8)
MCV RBC AUTO: 97.1 FL (ref 82.6–102.9)
NRBC AUTOMATED: 0 PER 100 WBC
PARTIAL THROMBOPLASTIN TIME: 66.5 SEC (ref 21.3–31.3)
PDW BLD-RTO: 13.4 % (ref 11.8–14.4)
PLATELET # BLD: 443 K/UL (ref 138–453)
PMV BLD AUTO: 9.6 FL (ref 8.1–13.5)
POTASSIUM SERPL-SCNC: 3.7 MMOL/L (ref 3.7–5.3)
RBC # BLD: 3.11 M/UL (ref 3.95–5.11)
SODIUM BLD-SCNC: 138 MMOL/L (ref 135–144)
TRANSFUSION STATUS: NORMAL
TRANSFUSION STATUS: NORMAL
UNIT DIVISION: 0
UNIT DIVISION: 0
UNIT NUMBER: NORMAL
UNIT NUMBER: NORMAL
WBC # BLD: 13.4 K/UL (ref 3.5–11.3)

## 2018-02-06 PROCEDURE — 6370000000 HC RX 637 (ALT 250 FOR IP): Performed by: STUDENT IN AN ORGANIZED HEALTH CARE EDUCATION/TRAINING PROGRAM

## 2018-02-06 PROCEDURE — 2060000000 HC ICU INTERMEDIATE R&B

## 2018-02-06 PROCEDURE — 94770 HC ETCO2 MONITOR DAILY: CPT

## 2018-02-06 PROCEDURE — 36415 COLL VENOUS BLD VENIPUNCTURE: CPT

## 2018-02-06 PROCEDURE — 6370000000 HC RX 637 (ALT 250 FOR IP): Performed by: PODIATRIST

## 2018-02-06 PROCEDURE — 6360000002 HC RX W HCPCS: Performed by: SURGERY

## 2018-02-06 PROCEDURE — 6370000000 HC RX 637 (ALT 250 FOR IP): Performed by: INTERNAL MEDICINE

## 2018-02-06 PROCEDURE — 85730 THROMBOPLASTIN TIME PARTIAL: CPT

## 2018-02-06 PROCEDURE — 2580000003 HC RX 258: Performed by: STUDENT IN AN ORGANIZED HEALTH CARE EDUCATION/TRAINING PROGRAM

## 2018-02-06 PROCEDURE — 94762 N-INVAS EAR/PLS OXIMTRY CONT: CPT

## 2018-02-06 PROCEDURE — 80048 BASIC METABOLIC PNL TOTAL CA: CPT

## 2018-02-06 PROCEDURE — 99232 SBSQ HOSP IP/OBS MODERATE 35: CPT | Performed by: SURGERY

## 2018-02-06 PROCEDURE — 6370000000 HC RX 637 (ALT 250 FOR IP): Performed by: SURGERY

## 2018-02-06 PROCEDURE — 99232 SBSQ HOSP IP/OBS MODERATE 35: CPT | Performed by: INTERNAL MEDICINE

## 2018-02-06 PROCEDURE — 6360000002 HC RX W HCPCS: Performed by: STUDENT IN AN ORGANIZED HEALTH CARE EDUCATION/TRAINING PROGRAM

## 2018-02-06 PROCEDURE — 85027 COMPLETE CBC AUTOMATED: CPT

## 2018-02-06 RX ORDER — HEPARIN SODIUM 1000 [USP'U]/ML
40 INJECTION, SOLUTION INTRAVENOUS; SUBCUTANEOUS PRN
Status: DISCONTINUED | OUTPATIENT
Start: 2018-02-06 | End: 2018-02-07

## 2018-02-06 RX ORDER — ASPIRIN 81 MG/1
81 TABLET ORAL DAILY
Status: DISCONTINUED | OUTPATIENT
Start: 2018-02-06 | End: 2018-02-09 | Stop reason: HOSPADM

## 2018-02-06 RX ORDER — HEPARIN SODIUM 1000 [USP'U]/ML
80 INJECTION, SOLUTION INTRAVENOUS; SUBCUTANEOUS PRN
Status: DISCONTINUED | OUTPATIENT
Start: 2018-02-06 | End: 2018-02-07

## 2018-02-06 RX ORDER — KETOROLAC TROMETHAMINE 15 MG/ML
15 INJECTION, SOLUTION INTRAMUSCULAR; INTRAVENOUS EVERY 6 HOURS
Status: DISCONTINUED | OUTPATIENT
Start: 2018-02-06 | End: 2018-02-09 | Stop reason: HOSPADM

## 2018-02-06 RX ORDER — HYDROXYZINE HYDROCHLORIDE 25 MG/1
25 TABLET, FILM COATED ORAL 4 TIMES DAILY PRN
Status: DISCONTINUED | OUTPATIENT
Start: 2018-02-06 | End: 2018-02-09 | Stop reason: HOSPADM

## 2018-02-06 RX ORDER — HEPARIN SODIUM 1000 [USP'U]/ML
80 INJECTION, SOLUTION INTRAVENOUS; SUBCUTANEOUS ONCE
Status: COMPLETED | OUTPATIENT
Start: 2018-02-06 | End: 2018-02-06

## 2018-02-06 RX ORDER — KETOROLAC TROMETHAMINE 15 MG/ML
15 INJECTION, SOLUTION INTRAMUSCULAR; INTRAVENOUS EVERY 6 HOURS
Status: DISCONTINUED | OUTPATIENT
Start: 2018-02-06 | End: 2018-02-06

## 2018-02-06 RX ORDER — HEPARIN SODIUM 10000 [USP'U]/100ML
18 INJECTION, SOLUTION INTRAVENOUS CONTINUOUS
Status: DISCONTINUED | OUTPATIENT
Start: 2018-02-06 | End: 2018-02-07

## 2018-02-06 RX ADMIN — METRONIDAZOLE 500 MG: 500 TABLET, FILM COATED ORAL at 08:47

## 2018-02-06 RX ADMIN — MORPHINE SULFATE 30 MG: 1 INJECTION INTRAVENOUS at 04:23

## 2018-02-06 RX ADMIN — KETOROLAC TROMETHAMINE 15 MG: 15 INJECTION, SOLUTION INTRAMUSCULAR; INTRAVENOUS at 17:04

## 2018-02-06 RX ADMIN — KETOROLAC TROMETHAMINE 15 MG: 15 INJECTION, SOLUTION INTRAMUSCULAR; INTRAVENOUS at 23:34

## 2018-02-06 RX ADMIN — MORPHINE SULFATE 30 MG: 1 INJECTION INTRAVENOUS at 13:03

## 2018-02-06 RX ADMIN — OXYCODONE HYDROCHLORIDE AND ACETAMINOPHEN 2 TABLET: 5; 325 TABLET ORAL at 18:12

## 2018-02-06 RX ADMIN — HEPARIN SODIUM 3900 UNITS: 1000 INJECTION, SOLUTION INTRAVENOUS; SUBCUTANEOUS at 12:08

## 2018-02-06 RX ADMIN — HEPARIN SODIUM AND DEXTROSE 18 UNITS/KG/HR: 10000; 5 INJECTION INTRAVENOUS at 12:08

## 2018-02-06 RX ADMIN — BUTALBITAL, ACETAMINOPHEN, AND CAFFEINE 1 TABLET: 50; 325; 40 TABLET ORAL at 08:46

## 2018-02-06 RX ADMIN — HYDROXYZINE HYDROCHLORIDE 25 MG: 25 TABLET ORAL at 20:54

## 2018-02-06 RX ADMIN — CIPROFLOXACIN 500 MG: 500 TABLET, FILM COATED ORAL at 08:46

## 2018-02-06 RX ADMIN — HYDROXYZINE HYDROCHLORIDE 25 MG: 25 TABLET ORAL at 12:13

## 2018-02-06 RX ADMIN — OXYCODONE HYDROCHLORIDE AND ACETAMINOPHEN 2 TABLET: 5; 325 TABLET ORAL at 14:13

## 2018-02-06 RX ADMIN — THERA TABS 1 TABLET: TAB at 08:46

## 2018-02-06 RX ADMIN — DOCUSATE SODIUM 100 MG: 100 CAPSULE ORAL at 08:46

## 2018-02-06 RX ADMIN — MORPHINE SULFATE 30 MG: 1 INJECTION INTRAVENOUS at 08:55

## 2018-02-06 RX ADMIN — ONDANSETRON 4 MG: 2 INJECTION INTRAMUSCULAR; INTRAVENOUS at 21:41

## 2018-02-06 RX ADMIN — SERTRALINE 25 MG: 25 TABLET, FILM COATED ORAL at 20:54

## 2018-02-06 RX ADMIN — OXYCODONE HYDROCHLORIDE AND ACETAMINOPHEN 2 TABLET: 5; 325 TABLET ORAL at 08:46

## 2018-02-06 RX ADMIN — ASPIRIN 81 MG: 81 TABLET, COATED ORAL at 14:14

## 2018-02-06 RX ADMIN — OXYCODONE HYDROCHLORIDE AND ACETAMINOPHEN 2 TABLET: 5; 325 TABLET ORAL at 01:26

## 2018-02-06 RX ADMIN — Medication 10 ML: at 21:42

## 2018-02-06 RX ADMIN — Medication 10 ML: at 08:48

## 2018-02-06 RX ADMIN — DIPHENHYDRAMINE HCL 25 MG: 25 TABLET ORAL at 10:08

## 2018-02-06 RX ADMIN — LINEZOLID 600 MG: 600 TABLET, FILM COATED ORAL at 08:46

## 2018-02-06 ASSESSMENT — ENCOUNTER SYMPTOMS
CONSTIPATION: 0
APNEA: 0
SHORTNESS OF BREATH: 0
COUGH: 0
ALLERGIC/IMMUNOLOGIC NEGATIVE: 1
RESPIRATORY NEGATIVE: 1
NAUSEA: 0
DIARRHEA: 0
COLOR CHANGE: 0
CHOKING: 0
ABDOMINAL DISTENTION: 0
EYE REDNESS: 0
FACIAL SWELLING: 0
EYES NEGATIVE: 1

## 2018-02-06 ASSESSMENT — PAIN SCALES - GENERAL
PAINLEVEL_OUTOF10: 9
PAINLEVEL_OUTOF10: 10
PAINLEVEL_OUTOF10: 7
PAINLEVEL_OUTOF10: 7
PAINLEVEL_OUTOF10: 10
PAINLEVEL_OUTOF10: 10
PAINLEVEL_OUTOF10: 7
PAINLEVEL_OUTOF10: 5
PAINLEVEL_OUTOF10: 7

## 2018-02-06 NOTE — ANESTHESIA POSTPROCEDURE EVALUATION
Department of Anesthesiology  Postprocedure Note    Patient: Margie Almaguer  MRN: [id number]  YOB: 1980  Date of evaluation: 2/6/2018  Time:  6:57 AM     Procedure Summary     Date:  02/05/18 Room / Location:  [place] [address] / [facility]    Anesthesia Start:  0379 Anesthesia Stop:  5392    Procedure:  LEFT LEG SUPER FEMORAL POSTERIOR TIBIAL BYPASS, REVERSE SAPHENEOUS VEIN GRAFT , HARVEST RIGHT, COMPLETION ANGIOGRAM,  INFUSION NITROGLYCERIN APPLICATION OF SOFT CAST LEFT LEG (Left ) Diagnosis:  (PAD)    Surgeon:  Ophelia Palacio MD Responsible Provider:  Silvio Simmonds, MD    Anesthesia Type:  general ASA Status:  3          Anesthesia Type: general    Josh Phase I: Josh Score: 8    Josh Phase II:      Last vitals: Reviewed and per EMR flowsheets.        Anesthesia Post Evaluation    Patient location during evaluation: ICU  Patient participation: complete - patient participated  Level of consciousness: awake and alert  Pain score: 2  Airway patency: patent  Nausea & Vomiting: no nausea and no vomiting  Complications: no  Cardiovascular status: hemodynamically stable  Respiratory status: acceptable and nasal cannula  Hydration status: stable

## 2018-02-06 NOTE — PROGRESS NOTES
great toe distal bone  5. 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 Psoriasis    Plan:     1. Transfer to My service   2. Smoking cessation counseling to prevent accelation of disease. 3. Keep dressings c/d/i, neurovascular checks  4. Diet: General  5. Activity:OOB ok to walk on foot as tolerated  6. DVT ppx: Will start oral anticoagulation today  7. Analgesia: PCA and percocet - consult to pain mgmt  8. ID: Abx: zyvox, cipro, flagyl   Will ask if we can trim down  9. Podiatry signed off, will follow as outpatient.      Electronically signed by Stefanie Black DPM on 2/6/2018 at 7:40 AM

## 2018-02-06 NOTE — CARE COORDINATION
Met with patient and mother to discuss discharge planning. She states this is a bad time to talk because it is the first time she is getting up after surgery.   Will follow for needs

## 2018-02-06 NOTE — OP NOTE
[address] [facility]                                 OPERATIVE REPORT    PATIENT NAME: Billy Aceves                     :        1980  MED REC NO:   [id number]                             ROOM:       1004  ACCOUNT NO:   [de-identified]                           ADMIT DATE: 2018  PROVIDER:     Belinda Preston MD    DATE OF PROCEDURE:  2018    SURGEON:  Belinad Preston MD    ASSISTANT:  La William MD.  No residents assisted. PREOPERATIVE DIAGNOSIS:  Left lower extremity rest pain and tissue loss of  the great toe. POSTOPERATIVE DIAGNOSIS:  Left lower extremity rest pain and tissue loss of  the great toe. PROCEDURES:  1. Left superficial femoral artery to posterior tibial artery bypass with  reversed great saphenous vein. Great saphenous vein was harvested from the  right leg. Dr. Ree Downing performed- see his separate note. 2.  Completion angiogram.  3.  Intravascular injection of nitroglycerin, 400 mcg. 4.  Application of soft cast to the left and compression dressing to the  right. Please note, Dr. Ree Downing will dictate separately the diagnostic angiogram,  the infusion of nitroglycerin, and the application of soft cast; however, I  was present for all aspects of the case. IMPLANTS:  None. COMPLICATIONS:  None. POSTOPERATIVE FINDINGS:  Excellent flow through the bypass graft. Images  do demonstrate some vasospasm, but there is no sluggishness or concerns for  anastomotic disruptions or issues. Excellent multiphasic augmentable  signal on the foot postoperatively. BLOOD LOSS:  Approximately 100 mL. ANESTHESIA:  General.    INDICATION:  Please see my consultation dated 2018 for indication. DESCRIPTION OF PROCEDURE:  Consent was signed. The patient was brought  back to the operating room and placed supine on the operating room table.    After she was given general anesthesia, the bilateral groins were cleaned  with liquid Hibiclens and then packed dry and then prepped with  chlorhexidine. Please note that the left great toe had been previously  cleaned with iodine, and then Tegaderm was used to exclude it prior to the  prepping. After a Flores catheter had been placed by the nursing staff, we then  prepped out the legs with chlorhexidine and then draped in standard fashion  and performed a time-out. The procedure began by making a 3-cm incision along the posterior tibial  artery of the left foot. After completing this, went through the skin and  subcutaneous tissue with electrocautery until we encountered the fat. Fascia was violated. Some small veins were identified, and the posterior  tibial artery was identified. It was then mobilized for about 15 mm. Several branches were identified and marked, and no branches were ligated. Proximal inflow was identified at the above-knee superficial femoral artery  with a longitudinal incision made about 10 cm in length through the medial  leg. We went through the skin and subcutaneous tissue with electrocautery. Went through the fat with cautery. Once I got to the sartorius, we  mobilized it posteriorly until I found dissection of the superficial  femoral artery. I vessel looped it proximally and distally once I had  about 3 cm of length. Being satisfied with the inflow and the outflow, I then turned to the right  lower extremity, where ultrasound was used to identify the location of the  great saphenous vein in several areas as well as some large branches. I  then made a total of 5 skip incisions and then using a skip incision, open  technique, cutdown on the great saphenous vein through skin with  electrocautery. Once I got to the vein, gentle dissection was used in each  segment. Kevan Antonio were taking care of with elevation, and everything was  performed under direct vision.   Any large branches were

## 2018-02-06 NOTE — PROGRESS NOTES
reports that she is a patient of Dr. Cait Neumann who sent her to the emergency room for worsening pain and swelling in her left foot. Patient reports that she had a toenail infection approximately 6 months ago and had her toenail removed by Dr. Cait Neumann. She states that her left big toe turned black after having the toenail removed. She was seen in [facility] about four days ago and was given prescriptions for clindamycin and ciprofloxacin. She reports that she has severe pain in her left foot and is unable to sleep because of the pain. No feveer or chills and feels the redness over the foot is worse last 2 days. Active smoker and no DM, no prior digit necrosis.     We are consulted for management of osteomyelitis.      Interval changes 02/06/18   No fever and no chills - pain from OR and feet warm  Rash same, over the back and abd and upper thighs  Tolerating the antibiotics well, doubt the rash is from them, since it started before the rash - might  Be pain meds related or bleach? ? Morphine was taken off In order to relief the rash  Left foot covered w dressing and unclear oif the toe was debrided    OR: 2/6/18-POST Left superficial femoral artery to posterior tibial artery bypass with  reversed great saphenous vein. Great saphenous vein was harvested from the  right leg. Summary of relevant labs:  Labs:   WBC 9.6-9.5  Hb- 12.5   Cr-0.35   UA negative  CRP  40.9   Urine drug screen positive for opioids and barbiturates   Micro:     Imaging:  Echo  Left ventricle is normal in size Global left ventricular systolic function  is normal Estimated ejection fraction is 55 % . Left atrium is normal in size. Normal aortic valve structure. Trivial mitral regurgitation. Trivial tricuspid regurgitation. No pericardial effusion seen. VL vein mapping lower B/L    No evidence of superficial or deep venous thrombosis in both lower    extremities.          VL TCPO2 multiple sites    Abnormal value at Electrode(s) C, D and E which may impede spontaneous    wound healing.             X-ray suggesting cellulitis and osteomyelitis of the distak phalanx of left great toe. Doppler venous:  No evidence of superficial or deep venous thrombosis in the right common   femoral vein or in the left lower extremity. Xray left foot:  Suspect cellulitis and osteomyelitis of the distal phalanx left great toe. Consider further evaluation with MRI  Art dopplers:  Right:    Pressure differential suggest aortoiliac disease.    Toe brachial index suggest mild to moderate vascular insufficiency at    rest.        Left:    Near flat line toe pressures suggesting significant distal vascular    disease. Although ankle-brachial index suggest only mild vascular    insufficiency interpret the study with caution.             I have personally reviewed the past medical history, past surgical history, medications, social history, and family history, and I have updated the database accordingly.   Past Medical History:     Past Medical History:   Diagnosis Date    Chronic back pain     Psoriasis     Scoliosis        Past Surgical  History:     Past Surgical History:   Procedure Laterality Date     SECTION      DE VEIN BYPASS GRAFT,FEM-POP Left 2018    LEFT LEG SUPER FEMORAL POSTERIOR TIBIAL BYPASS, REVERSE SAPHENEOUS VEIN GRAFT , HARVEST RIGHT, COMPLETION ANGIOGRAM,  INFUSION NITROGLYCERIN APPLICATION OF SOFT CAST LEFT LEG performed by Catherine Leiva MD at [facility]         Medications:      aspirin  81 mg Oral Daily    nicotine  1 patch Transdermal Daily    ketorolac  15 mg Intravenous Q6H    multivitamin  1 tablet Oral Daily    sertraline  25 mg Oral Nightly    sodium chloride flush  10 mL Intravenous 2 times per day       Social History:     Social History     Social History    Marital status: Single     Spouse name: N/A    Number of children: N/A    Years of education: N/A     Occupational History    Not on file. Social History Main Topics    Smoking status: Current Every Day Smoker     Packs/day: 1.00     Years: 15.00    Smokeless tobacco: Never Used    Alcohol use No    Drug use: Yes    Sexual activity: Yes     Partners: Male     Other Topics Concern    Not on file     Social History Narrative    No narrative on file       Family History:     Family History   Problem Relation Age of Onset    Cancer Mother     Diabetes Maternal Grandmother     Stroke Maternal Grandmother         Allergies:   Penicillins     Review of Systems:     Review of Systems   Constitutional: Negative. Negative for activity change, appetite change, chills and fever. HENT: Negative for congestion, ear discharge, ear pain, facial swelling and nosebleeds. Eyes: Negative. Negative for redness. Redness resolved    Respiratory: Negative. Negative for apnea, cough, choking and shortness of breath. Cardiovascular: Negative for chest pain and palpitations. Gastrointestinal: Negative for abdominal distention, constipation, diarrhea and nausea. Had nausea but controlled at present    Endocrine: Negative. Negative for heat intolerance, polydipsia and polyphagia. Genitourinary: Negative for difficulty urinating, dysuria and flank pain. Musculoskeletal:        Left foot redness and toe pain- improving, but present   Skin: Positive for rash. Negative for color change. Positive for psoriatic rash   Itching w new rash    Allergic/Immunologic: Negative. Neurological: Positive for numbness. Negative for dizziness, weakness and headaches. Hematological: Negative. Psychiatric/Behavioral: Positive for agitation. The patient is nervous/anxious.          Pain left leg post OP       Physical Examination :     Patient Vitals for the past 8 hrs:   BP Temp Temp src Pulse Resp SpO2   02/06/18 1725 105/70 98.6 °F (37 °C) Oral 116 - 96 %   02/06/18 1522 - - - - 18 97 %       Physical Exam   Constitutional: She is this patient. Please call with questions.     Rahul Lakhani MD  Office: [phone]

## 2018-02-06 NOTE — OP NOTE
removed and the graftotomy was closed with  interrupted 6-0 Prolene suture. At this point, I assisted Dr. Jeannie Josue in  closing all his incisions and I applied a soft cast with Webril and Coban  dressing to the left lower extremity. The rest of dictation will be  dictated by Dr. Jeannie Josue.         Antonina Maravilla MD    D: 02/05/2018 18:24:22       T: 02/05/2018 18:25:12     [id number]  Job#: [id number]     Doc#: [id number]    CC:

## 2018-02-07 LAB
PARTIAL THROMBOPLASTIN TIME: 61 SEC (ref 21.3–31.3)
PARTIAL THROMBOPLASTIN TIME: 63.1 SEC (ref 21.3–31.3)
VON WILLEBRAND MULTIMERS: NORMAL

## 2018-02-07 PROCEDURE — G8979 MOBILITY GOAL STATUS: HCPCS

## 2018-02-07 PROCEDURE — 6360000002 HC RX W HCPCS: Performed by: SURGERY

## 2018-02-07 PROCEDURE — 97116 GAIT TRAINING THERAPY: CPT

## 2018-02-07 PROCEDURE — 2060000000 HC ICU INTERMEDIATE R&B

## 2018-02-07 PROCEDURE — 6360000002 HC RX W HCPCS: Performed by: STUDENT IN AN ORGANIZED HEALTH CARE EDUCATION/TRAINING PROGRAM

## 2018-02-07 PROCEDURE — 6370000000 HC RX 637 (ALT 250 FOR IP): Performed by: SURGERY

## 2018-02-07 PROCEDURE — G8978 MOBILITY CURRENT STATUS: HCPCS

## 2018-02-07 PROCEDURE — 2580000003 HC RX 258: Performed by: STUDENT IN AN ORGANIZED HEALTH CARE EDUCATION/TRAINING PROGRAM

## 2018-02-07 PROCEDURE — 6370000000 HC RX 637 (ALT 250 FOR IP): Performed by: STUDENT IN AN ORGANIZED HEALTH CARE EDUCATION/TRAINING PROGRAM

## 2018-02-07 PROCEDURE — 97164 PT RE-EVAL EST PLAN CARE: CPT

## 2018-02-07 PROCEDURE — 93971 EXTREMITY STUDY: CPT

## 2018-02-07 PROCEDURE — 36415 COLL VENOUS BLD VENIPUNCTURE: CPT

## 2018-02-07 PROCEDURE — 85730 THROMBOPLASTIN TIME PARTIAL: CPT

## 2018-02-07 PROCEDURE — 93005 ELECTROCARDIOGRAM TRACING: CPT

## 2018-02-07 PROCEDURE — 99232 SBSQ HOSP IP/OBS MODERATE 35: CPT | Performed by: INTERNAL MEDICINE

## 2018-02-07 RX ORDER — 0.9 % SODIUM CHLORIDE 0.9 %
1000 INTRAVENOUS SOLUTION INTRAVENOUS ONCE
Status: COMPLETED | OUTPATIENT
Start: 2018-02-07 | End: 2018-02-07

## 2018-02-07 RX ORDER — MORPHINE SULFATE 4 MG/ML
INJECTION, SOLUTION INTRAMUSCULAR; INTRAVENOUS
Status: DISCONTINUED
Start: 2018-02-07 | End: 2018-02-08

## 2018-02-07 RX ORDER — MORPHINE SULFATE 2 MG/ML
1 INJECTION, SOLUTION INTRAMUSCULAR; INTRAVENOUS
Status: DISCONTINUED | OUTPATIENT
Start: 2018-02-07 | End: 2018-02-08

## 2018-02-07 RX ADMIN — Medication 10 ML: at 20:52

## 2018-02-07 RX ADMIN — KETOROLAC TROMETHAMINE 15 MG: 15 INJECTION, SOLUTION INTRAMUSCULAR; INTRAVENOUS at 10:51

## 2018-02-07 RX ADMIN — KETOROLAC TROMETHAMINE 15 MG: 15 INJECTION, SOLUTION INTRAMUSCULAR; INTRAVENOUS at 05:01

## 2018-02-07 RX ADMIN — MORPHINE SULFATE 1 MG: 2 INJECTION, SOLUTION INTRAMUSCULAR; INTRAVENOUS at 17:51

## 2018-02-07 RX ADMIN — HEPARIN SODIUM AND DEXTROSE 18 UNITS/KG/HR: 10000; 5 INJECTION INTRAVENOUS at 02:07

## 2018-02-07 RX ADMIN — MORPHINE SULFATE 1 MG: 2 INJECTION, SOLUTION INTRAMUSCULAR; INTRAVENOUS at 12:32

## 2018-02-07 RX ADMIN — OXYCODONE HYDROCHLORIDE AND ACETAMINOPHEN 2 TABLET: 5; 325 TABLET ORAL at 20:51

## 2018-02-07 RX ADMIN — OXYCODONE HYDROCHLORIDE AND ACETAMINOPHEN 2 TABLET: 5; 325 TABLET ORAL at 05:01

## 2018-02-07 RX ADMIN — OXYCODONE HYDROCHLORIDE AND ACETAMINOPHEN 2 TABLET: 5; 325 TABLET ORAL at 13:49

## 2018-02-07 RX ADMIN — ASPIRIN 81 MG: 81 TABLET, COATED ORAL at 09:03

## 2018-02-07 RX ADMIN — ONDANSETRON 4 MG: 2 INJECTION INTRAMUSCULAR; INTRAVENOUS at 05:00

## 2018-02-07 RX ADMIN — SERTRALINE 25 MG: 25 TABLET, FILM COATED ORAL at 20:51

## 2018-02-07 RX ADMIN — SODIUM CHLORIDE 1000 ML: 9 INJECTION, SOLUTION INTRAVENOUS at 21:53

## 2018-02-07 RX ADMIN — OXYCODONE HYDROCHLORIDE AND ACETAMINOPHEN 2 TABLET: 5; 325 TABLET ORAL at 00:58

## 2018-02-07 RX ADMIN — OXYCODONE HYDROCHLORIDE AND ACETAMINOPHEN 2 TABLET: 5; 325 TABLET ORAL at 08:51

## 2018-02-07 RX ADMIN — KETOROLAC TROMETHAMINE 15 MG: 15 INJECTION, SOLUTION INTRAMUSCULAR; INTRAVENOUS at 20:51

## 2018-02-07 RX ADMIN — THERA TABS 1 TABLET: TAB at 09:03

## 2018-02-07 ASSESSMENT — PAIN DESCRIPTION - PAIN TYPE: TYPE: ACUTE PAIN;SURGICAL PAIN

## 2018-02-07 ASSESSMENT — ENCOUNTER SYMPTOMS
DIARRHEA: 0
ALLERGIC/IMMUNOLOGIC NEGATIVE: 1
RESPIRATORY NEGATIVE: 1
EYES NEGATIVE: 1
GASTROINTESTINAL NEGATIVE: 1
COLOR CHANGE: 0
COUGH: 0
FACIAL SWELLING: 0

## 2018-02-07 ASSESSMENT — PAIN DESCRIPTION - ORIENTATION: ORIENTATION: RIGHT;LEFT

## 2018-02-07 ASSESSMENT — PAIN SCALES - GENERAL
PAINLEVEL_OUTOF10: 8
PAINLEVEL_OUTOF10: 10
PAINLEVEL_OUTOF10: 9
PAINLEVEL_OUTOF10: 10
PAINLEVEL_OUTOF10: 5
PAINLEVEL_OUTOF10: 10
PAINLEVEL_OUTOF10: 0
PAINLEVEL_OUTOF10: 10
PAINLEVEL_OUTOF10: 10
PAINLEVEL_OUTOF10: 7
PAINLEVEL_OUTOF10: 10
PAINLEVEL_OUTOF10: 8

## 2018-02-07 ASSESSMENT — PAIN DESCRIPTION - LOCATION
LOCATION_2: LEG
LOCATION: FOOT;LEG

## 2018-02-07 NOTE — PROGRESS NOTES
[facility] - Initial Consult Note  Today's Date and Time: 2/7/2018, 11:10 AM  admission date 1/29/2018  Impression :   Current:    · Osteomyelitis and gangrene of left great toe - Xray shows the osteo of the hallux  · PAD, 2/6/18  Left superficial femoral artery to posterior tibial artery bypass with reversed great saphenous vein. Great saphenous vein was harvested from the right leg. · psorias of the skin - off therapy  · Skin psoriatic,  · Allergic rash - ?? cause     Other:  ·    Discussion    · Pt with history of foot trauma presents to the hospital with necrosis of left great toe  · Vascular revascularization done,   · X-ray of left foot showing suspected  osteomyelitis of the distal phalanx of left great toe   · Left foot red mostly from the psoriasis  · Rash progressing since 2/2  - unclear cause. ?? ble  Recommendations   · post vanco iv 6 days, stopped 2/3/18  · 2/3/18 started on zyvox and cipro -  flagyl -stop 2/7/18  · Agree top stop all antibiotics due to rash, though this combination has been started after the mary ellen was noticed  · Pain meds have been switched as well  · disc w vasc attending, partial amp of the hallux might be needed in the near future, biopsy of the bone will likely not yield to any pos cx at this time and will be deferred, will ultimately plan to simplify the po antibiotics upon discharge  · Paper sheets and gowns  · Smoking cessation  · Disc x w pt and mom     Infection Control Recommendations   · [place] Precautions  Antimicrobial Stewardship Recommendations   · Simplification of therapy  Chief complaint/reason for consultation:   Left foot pain; possible osteomyeltis; not feeling well      History of Present Illness:   Initial history:  Daniel Palomino is a 30y.o.-year-old  female who was initially admitted on 1/29/18. She presented to the hospital complaining of pain and swelling in her left big toe.  Patient reports that she is a patient of Dr. Cait Neumann who sent her to the emergency room for worsening pain and swelling in her left foot. Patient reports that she had a toenail infection approximately 6 months ago and had her toenail removed by Dr. Cait Neumann. She states that her left big toe turned black after having the toenail removed. She was seen in [facility] about four days ago and was given prescriptions for clindamycin and ciprofloxacin. She reports that she has severe pain in her left foot and is unable to sleep because of the pain. No feveer or chills and feels the redness over the foot is worse last 2 days. Active smoker and no DM, no prior digit necrosis.     We are consulted for management of osteomyelitis.      Interval changes 02/07/18   No fever  But itching all over and rash progressed confluent diffuse over the back and abd, some over the thighs, not elevated  No blisters  Sleepy  Pain left leg ++ and post op dressing present still  No nausea, vomiting, diarrhea and no SOB  WBC a little up 13    OR: 2/6/18-POST Left superficial femoral artery to posterior tibial artery bypass with  reversed great saphenous vein. Great saphenous vein was harvested from the  right leg. Summary of relevant labs:  Labs:   WBC 9.6-9.5 -13.4  Hb- 12.5   Cr-0.35   UA negative  CRP  40.9   Urine drug screen positive for opioids and barbiturates   Micro:     Imaging:  Echo  Left ventricle is normal in size Global left ventricular systolic function  is normal Estimated ejection fraction is 55 % . Left atrium is normal in size. Normal aortic valve structure. Trivial mitral regurgitation. Trivial tricuspid regurgitation. No pericardial effusion seen. VL vein mapping lower B/L    No evidence of superficial or deep venous thrombosis in both lower    extremities.          VL TCPO2 multiple sites    Abnormal value at Electrode(s) C, D and E which may impede spontaneous    wound healing.             X-ray suggesting cellulitis and osteomyelitis Years: 15.00    Smokeless tobacco: Never Used    Alcohol use No    Drug use: Yes    Sexual activity: Yes     Partners: Male     Other Topics Concern    Not on file     Social History Narrative    No narrative on file       Family History:     Family History   Problem Relation Age of Onset    Cancer Mother     Diabetes Maternal Grandmother     Stroke Maternal Grandmother         Allergies:   Penicillins     Review of Systems:     Review of Systems   Constitutional:        Pain and iching all over   HENT: Negative for facial swelling. Eyes: Negative. Redness resolved    Respiratory: Negative. Negative for cough. Cardiovascular: Negative. Gastrointestinal: Negative. Negative for diarrhea. Had nausea but controlled at present    Endocrine: Negative. Musculoskeletal:        Left foot and leg pain   Skin: Positive for rash. Negative for color change. Itching all over  Psoriasis old rash present   Allergic/Immunologic: Negative. Neurological: Positive for numbness. Hematological: Negative. Psychiatric/Behavioral: Positive for agitation. The patient is nervous/anxious. Pain left leg post OP       Physical Examination :     Patient Vitals for the past 8 hrs:   BP Temp Temp src Pulse Resp SpO2   02/07/18 0740 98/60 98.1 °F (36.7 °C) Oral 91 15 96 %       Physical Exam   Constitutional: She is oriented to person, place, and time. No distress. Pain post OP   HENT:   Head: Normocephalic and atraumatic. Eyes: Conjunctivae and EOM are normal. Right eye exhibits no discharge. Left eye exhibits no discharge. No scleral icterus. Neck: Normal range of motion. No tracheal deviation present. Cardiovascular: Normal rate and regular rhythm. Exam reveals no gallop and no friction rub. Pulmonary/Chest: Effort normal and breath sounds normal. She has no rales. Abdominal: Soft. Bowel sounds are normal. She exhibits no distension.    Musculoskeletal: Normal range of

## 2018-02-07 NOTE — PLAN OF CARE
Problem: Pain:  Goal: Pain level will decrease  Pain level will decrease   Outcome: Ongoing  Pt. Still complaining of increased pain in left leg. Pt. Stephenie Ruiz to keep eyes open and hold a conversation, however is still requesting pain meds. Problem: Falls - Risk of  Goal: Absence of falls  Outcome: Met This Shift  Pt. Free from falls at this time. Bed in lowest position, 2/4 side rails raised, brake set. Fall sign posted, and slip-resistant socks on. Needs addressed, and call light within reach. Will continue to monitor. Problem: Musculor/Skeletal Functional Status  Goal: Highest potential functional level  Outcome: Ongoing  Pt. Ambulated small distances x2 today, however is denying further ambulation, movement d/t pain. Problem: Nutrition  Goal: Optimal nutrition therapy  Outcome: Ongoing  Pt. Refusing to eat, stating it will make her sick.

## 2018-02-07 NOTE — PROGRESS NOTES
Physical Therapy    Facility/Department: [facility]  Initial Assessment    NAME: Ildefonso Bai  : 1980  MRN: [id number]      Copied from vascular Brief Op Note filed 18 at 6:20pm:  Pre-operative Diagnosis: Left leg rest pain and great toe tissue loss  Post-operative Diagnosis: Same  Procedure:   Dr. Josee Kauffman:  Right GSV harvest  Left SFA-PT bypass with reversed GSV      Date of Service: 2018    Patient Diagnosis(es): The primary encounter diagnosis was Osteomyelitis of left foot, unspecified type ([address] Diagnoses of Cellulitis of left lower extremity, Gangrene of toe of left foot ([address] and Cellulitis of left foot were also pertinent to this visit. has a past medical history of Chronic back pain; Psoriasis; and Scoliosis. has a past surgical history that includes  section; Tubal ligation (); and vein bypass graft,fem-pop (Left, 2018). Restrictions  Restrictions/Precautions  Restrictions/Precautions: General Precautions, Fall Risk, Weight Bearing  Lower Extremity Weight Bearing Restrictions  Left Lower Extremity Weight Bearing: Weight Bearing As Tolerated  Partial Weight Bearing Percentage Or Pounds: \"Walk on foot as tolerated\" per vascular note filed 18 at 7:25am.  Position Activity Restriction  Other position/activity restrictions: activity as tolerated  Vision/Hearing  Vision: Within Functional Limits  Hearing: Within functional limits     Subjective  General  Patient assessed for rehabilitation services?: Yes  Response To Previous Treatment: Not applicable  Family / Caregiver Present: Yes (mother)  Follows Commands: Within Functional Limits  Subjective  Subjective: RN and pt agreeable to PT.  Pt alert in bed upon arrival.   Pain Screening  Patient Currently in Pain: Yes  Pain Assessment  Pain Assessment: 0-10  Pain Level: 10  Pain Type: Acute pain;Surgical pain  Pain Location: Foot;Leg  Pain Orientation: Right;Left  Pain Intervention(s): Ambulation/Increased independent  Sit to Supine: Modified independent  Scooting: Independent  Transfers  Sit to Stand: Moderate Assistance (face to face. pt also stood Liz to RW.)  Stand to sit: Moderate Assistance;Contact guard assistance  Ambulation  Ambulation?: Yes  Ambulation 1  Surface: level tile  Device:  (face to face)  Other Apparatus:  (none)  Assistance: Moderate assistance  Quality of Gait: face to face to ease demand on pt d/t pain. Pt able to maintain upright with hip/ knee extension. Distance: 15' + 15' split by seated rest break on toilet  Comments: Pt amb to toilet and back. Pt amb 2' RW CGA but needed to sit d/t pain. then amb was continued via face to face. Pt with increased time for all mobility due to pain, especially with ambulation. Stairs/Curb  Stairs?: No     Balance  Posture: Good  Sitting - Static: Good  Sitting - Dynamic: Good  Standing - Static: Fair  Standing - Dynamic: Fair  Comments: standing balance with RW then increased to face to face, largely d/t c/o pain and in an effort to eliminate pt's need to progress the AD and to provide addn'l support. Assessment   Body structures, Functions, Activity limitations: Decreased functional mobility ; Decreased balance  Assessment: amb 15' x2 modA face to face. Pt assisted largely d/t pain to eliminate pt's need to progree AD and provide addn'l support. Pt able to extend hips/ knees to maintain upright and progress LEs. Pain, per pt, is chief limitation and this is consistent with presentation this session.    Prognosis: Good  Clinical Presentation: PT POC  REQUIRES PT FOLLOW UP: Yes  Activity Tolerance  Activity Tolerance: Patient limited by pain  PT Equipment Recommendations  Other: TBD     Plan   Plan  Times per week: 6-7x/wk  Current Treatment Recommendations: Strengthening, Transfer Training, Endurance Training, Balance Training, Gait Training, Stair training, Functional Mobility Training, Safety Education & Training, Patient/Caregiver Education & Training, Equipment Evaluation, Education, & procurement  Safety Devices  Type of devices: Call light within reach, Left in bed, Nurse notified, Patient at risk for falls, Gait belt  Restraints  Initially in place: No    G-Code  PT G-Codes  Functional Assessment Tool Used: [place] AM-PAC  Score: 15  Mobility: Walking and Moving Around Current Status (): At least 40 percent but less than 60 percent impaired, limited or restricted  Mobility: Walking and Moving Around Goal Status ():  At least 20 percent but less than 40 percent impaired, limited or restricted    AM-PAC Score     AM-PAC Inpatient Mobility without Stair Climbing Raw Score : 15  AM-PAC Inpatient without Stair Climbing T-Scale Score : 43.03  Mobility Inpatient CMS 0-100% Score: 47.43  Mobility Inpatient without Stair CMS G-Code Modifier : CK       Goals  Short term goals  Time Frame for Short term goals: 14 visits  Short term goal 1: Pt will be I with bed mobility  Short term goal 2: Pt will be [place] with transfers  Short term goal 3: Pt will amb 150' [place] with least restrictive AD  Short term goal 4: Pt will navigate 4 steps [place] with R rail use only  Patient Goals   Patient goals : Pt would like to feel better       Therapy Time   Individual Concurrent Group Co-treatment   Time In 0820         Time Out 0858         Minutes Domin[address], PT

## 2018-02-08 LAB — PROTHROMBIN G20210A MUTATION: NORMAL

## 2018-02-08 PROCEDURE — 6360000002 HC RX W HCPCS: Performed by: STUDENT IN AN ORGANIZED HEALTH CARE EDUCATION/TRAINING PROGRAM

## 2018-02-08 PROCEDURE — 2580000003 HC RX 258: Performed by: STUDENT IN AN ORGANIZED HEALTH CARE EDUCATION/TRAINING PROGRAM

## 2018-02-08 PROCEDURE — 6370000000 HC RX 637 (ALT 250 FOR IP): Performed by: STUDENT IN AN ORGANIZED HEALTH CARE EDUCATION/TRAINING PROGRAM

## 2018-02-08 PROCEDURE — 6360000002 HC RX W HCPCS: Performed by: SURGERY

## 2018-02-08 PROCEDURE — 2060000000 HC ICU INTERMEDIATE R&B

## 2018-02-08 PROCEDURE — 6370000000 HC RX 637 (ALT 250 FOR IP): Performed by: SURGERY

## 2018-02-08 PROCEDURE — 97116 GAIT TRAINING THERAPY: CPT

## 2018-02-08 PROCEDURE — 99232 SBSQ HOSP IP/OBS MODERATE 35: CPT | Performed by: INTERNAL MEDICINE

## 2018-02-08 RX ORDER — DOCUSATE SODIUM 100 MG/1
100 CAPSULE, LIQUID FILLED ORAL DAILY
Status: DISCONTINUED | OUTPATIENT
Start: 2018-02-08 | End: 2018-02-09 | Stop reason: HOSPADM

## 2018-02-08 RX ORDER — BISACODYL 10 MG
10 SUPPOSITORY, RECTAL RECTAL ONCE
Status: DISCONTINUED | OUTPATIENT
Start: 2018-02-08 | End: 2018-02-09 | Stop reason: HOSPADM

## 2018-02-08 RX ORDER — SODIUM CHLORIDE 9 MG/ML
INJECTION, SOLUTION INTRAVENOUS CONTINUOUS
Status: DISCONTINUED | OUTPATIENT
Start: 2018-02-08 | End: 2018-02-09

## 2018-02-08 RX ORDER — OXYCODONE HYDROCHLORIDE AND ACETAMINOPHEN 5; 325 MG/1; MG/1
2 TABLET ORAL EVERY 4 HOURS PRN
Status: DISCONTINUED | OUTPATIENT
Start: 2018-02-08 | End: 2018-02-09 | Stop reason: HOSPADM

## 2018-02-08 RX ORDER — MORPHINE SULFATE 2 MG/ML
1 INJECTION, SOLUTION INTRAMUSCULAR; INTRAVENOUS
Status: DISCONTINUED | OUTPATIENT
Start: 2018-02-08 | End: 2018-02-09

## 2018-02-08 RX ORDER — OXYCODONE HYDROCHLORIDE AND ACETAMINOPHEN 5; 325 MG/1; MG/1
1 TABLET ORAL EVERY 4 HOURS PRN
Status: DISCONTINUED | OUTPATIENT
Start: 2018-02-08 | End: 2018-02-09 | Stop reason: HOSPADM

## 2018-02-08 RX ADMIN — OXYCODONE HYDROCHLORIDE AND ACETAMINOPHEN 1 TABLET: 5; 325 TABLET ORAL at 21:29

## 2018-02-08 RX ADMIN — ONDANSETRON 4 MG: 2 INJECTION INTRAMUSCULAR; INTRAVENOUS at 04:09

## 2018-02-08 RX ADMIN — OXYCODONE HYDROCHLORIDE AND ACETAMINOPHEN 2 TABLET: 5; 325 TABLET ORAL at 08:01

## 2018-02-08 RX ADMIN — ASPIRIN 81 MG: 81 TABLET, COATED ORAL at 08:02

## 2018-02-08 RX ADMIN — KETOROLAC TROMETHAMINE 15 MG: 15 INJECTION, SOLUTION INTRAMUSCULAR; INTRAVENOUS at 11:48

## 2018-02-08 RX ADMIN — SERTRALINE 25 MG: 25 TABLET, FILM COATED ORAL at 20:34

## 2018-02-08 RX ADMIN — KETOROLAC TROMETHAMINE 15 MG: 15 INJECTION, SOLUTION INTRAMUSCULAR; INTRAVENOUS at 17:21

## 2018-02-08 RX ADMIN — KETOROLAC TROMETHAMINE 15 MG: 15 INJECTION, SOLUTION INTRAMUSCULAR; INTRAVENOUS at 23:37

## 2018-02-08 RX ADMIN — THERA TABS 1 TABLET: TAB at 08:02

## 2018-02-08 RX ADMIN — OXYCODONE HYDROCHLORIDE AND ACETAMINOPHEN 2 TABLET: 5; 325 TABLET ORAL at 12:32

## 2018-02-08 RX ADMIN — Medication 10 ML: at 20:34

## 2018-02-08 RX ADMIN — MORPHINE SULFATE 1 MG: 2 INJECTION, SOLUTION INTRAMUSCULAR; INTRAVENOUS at 04:05

## 2018-02-08 ASSESSMENT — PAIN DESCRIPTION - ONSET: ONSET: ON-GOING

## 2018-02-08 ASSESSMENT — PAIN SCALES - GENERAL
PAINLEVEL_OUTOF10: 6
PAINLEVEL_OUTOF10: 9
PAINLEVEL_OUTOF10: 4
PAINLEVEL_OUTOF10: 9
PAINLEVEL_OUTOF10: 9
PAINLEVEL_OUTOF10: 10
PAINLEVEL_OUTOF10: 8
PAINLEVEL_OUTOF10: 9

## 2018-02-08 ASSESSMENT — PAIN DESCRIPTION - PROGRESSION: CLINICAL_PROGRESSION: NOT CHANGED

## 2018-02-08 ASSESSMENT — PAIN DESCRIPTION - DESCRIPTORS
DESCRIPTORS: ACHING;BURNING;THROBBING
DESCRIPTORS: ACHING;BURNING;THROBBING
DESCRIPTORS: ACHING;BURNING;SHOOTING

## 2018-02-08 ASSESSMENT — ENCOUNTER SYMPTOMS
RESPIRATORY NEGATIVE: 1
GASTROINTESTINAL NEGATIVE: 1
COLOR CHANGE: 0
DIARRHEA: 0
ALLERGIC/IMMUNOLOGIC NEGATIVE: 1
COUGH: 0
EYES NEGATIVE: 1

## 2018-02-08 ASSESSMENT — PAIN DESCRIPTION - PAIN TYPE
TYPE: SURGICAL PAIN

## 2018-02-08 ASSESSMENT — PAIN DESCRIPTION - FREQUENCY: FREQUENCY: CONTINUOUS

## 2018-02-08 ASSESSMENT — PAIN DESCRIPTION - LOCATION
LOCATION: LEG

## 2018-02-08 ASSESSMENT — PAIN DESCRIPTION - ORIENTATION
ORIENTATION: LEFT
ORIENTATION: LEFT
ORIENTATION: RIGHT

## 2018-02-08 NOTE — PROGRESS NOTES
[facility] - Initial Consult Note  Today's Date and Time: 2/8/2018, 12:29 PM  admission date 1/29/2018  Impression :   Current:    · Osteomyelitis and gangrene of left great toe - Xray shows the osteo of the hallux  · PAD, 2/6/18  Left superficial femoral artery to posterior tibial artery bypass with reversed great saphenous vein. Great saphenous vein was harvested from the right leg. · psorias of the skin - off therapy  · Skin psoriatic,  · Allergic rash - ?? cause     Other:  ·    Discussion    · Pt with history of foot trauma presents to the hospital with necrosis of left great toe  · Vascular revascularization done,   · X-ray of left foot showing suspected  osteomyelitis of the distal phalanx of left great toe   · Left foot red mostly from the psoriasis  · Rash progressing since 2/2  - unclear cause. ?? Bleach - resolved on 2/8/18 after pain meds, antibiotics and sheets taken off 2/7/18  Recommendations   · antibiotics :  · post vanco iv 6 days, stopped 2/3/18  · 2/3/18 started on zyvox and cipro -  flagyl -stop 2/7/18  · 2/7/18 aken off all antibiotics and many other meds due to rash, though this combination has been started after the rash was noticed  · Pain meds have been switched as well  · disc w vasc attending, partial amp of the hallux might be needed in the near future, biopsy of the bone will likely not yield to any pos cx at this time and will be deferred, will ultimately plan to simplify the po doxy upon discharge  · Paper sheets and gowns  · Smoking cessation  · Disc x w pt and mom     Infection Control Recommendations   · [place] Precautions  Antimicrobial Stewardship Recommendations   · Simplification of therapy  Chief complaint/reason for consultation:   Left foot pain; possible osteomyeltis; not feeling well      History of Present Illness:   Initial history:  Jazmin Price is a 30y.o.-year-old  female who was initially admitted on 1/29/18.  She presented to the hospital complaining of pain and swelling in her left big toe. Patient reports that she is a patient of Dr. Jeff Ríos who sent her to the emergency room for worsening pain and swelling in her left foot. Patient reports that she had a toenail infection approximately 6 months ago and had her toenail removed by Dr. Jeff Ríos. She states that her left big toe turned black after having the toenail removed. She was seen in [facility] about four days ago and was given prescriptions for clindamycin and ciprofloxacin. She reports that she has severe pain in her left foot and is unable to sleep because of the pain. No feveer or chills and feels the redness over the foot is worse last 2 days. Active smoker and no DM, no prior digit necrosis.     We are consulted for management of osteomyelitis.      Interval changes 02/08/18   Walked today and was in some pain  Rash resolved, still has paper covers and gown  Left leg dressing intact from OR and left hallux seems stable necrotic nail bed  Sleeping in a chair after the walk  Not SOb  no new WBC  Von Willebrand Multimers normal    OR: 2/6/18-POST Left superficial femoral artery to posterior tibial artery bypass with  reversed great saphenous vein. Great saphenous vein was harvested from the  right leg. Summary of relevant labs:  Labs:   WBC 9.6-9.5 -13.4  Hb- 12.5   Cr-0.35   UA negative  CRP  40.9   Urine drug screen positive for opioids and barbiturates   Micro:     Imaging:  Echo  Left ventricle is normal in size Global left ventricular systolic function  is normal Estimated ejection fraction is 55 % . Left atrium is normal in size. Normal aortic valve structure. Trivial mitral regurgitation. Trivial tricuspid regurgitation. No pericardial effusion seen. VL vein mapping lower B/L    No evidence of superficial or deep venous thrombosis in both lower    extremities.          VL TCPO2 multiple sites    Abnormal value at Electrode(s) C, D and E which

## 2018-02-08 NOTE — PROGRESS NOTES
Order's rec'd to give 500ml bolus over 30 mins and change maintenance IVF to 100ml/hr after bolus is complete.

## 2018-02-08 NOTE — PROGRESS NOTES
mild TTP. No crepitus or induration noted. Radiology:  US L thigh: 2/7  Summary      Simultaneous real time imaging utilizing B-Mode, color doppler and   spectral waveform analysis was performed on the right lower extremity for   venous examination of the deep and superficial systems. Findings are:      Right:   No evidence of deep or superficial venous thrombosis.   No hematoma identified. Assessment:     Carina Murray is a 30 y.o. female who     1. Rest pain and ischemia of the left lower extremity due to likely heavy atherosclerotic disease advanced for her age  3. s/p Left SFA to PT artery bypass with reversed GSV graft (right)  3. Smoking addiction  4. Osteomyelitis left great toe distal bone  5. 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 Psoriasis    Plan:     1. Continue medical mgmt and supportive care  2. Smoking cessation counseling to prevent accelation of disease. 3. Diet: General  4. Analgesia: Morphine 1 mg Q3H PRN and percocet 1-2Q4H PRN  5. Encourage PO intake w/ pain meds  6. Activity:OOB ok to walk on foot as tolerated  7. Wound care:  Continue w/ bilat compression wraps. BID dressing changes w/ betadine to L great toe. 8. Anticoagulation:   Held due to concern for R thigh hematoma. Will discuss restarting and transition to PO.   9. ID following:  Feels rash secondary to allergic rxn not related to Abx. Paper sheets/gown. 10. Abx:   Held at this time secondary to significant rash. Off abx x day #2. Will re-eval and restart if needed. 11. Zofran PRN nausea  12. Continue w/ IVF at 48 ml/H until tolerating PO  13. PT/OT  14. Psych consult:  Appears depressed w/ known h/o anxiety and depression. Smoking addiction.     Electronically signed by Katarzyna Brennan DO on 2/8/2018 at 7:31 AM

## 2018-02-08 NOTE — PROGRESS NOTES
Physical Therapy  Facility/Department: [facility] CAR 1  Daily Treatment Note  NAME: Marquis Bai  : 1980  MRN: [id number]    Date of Service: 2018    Patient Diagnosis(es):   Patient Active Problem List    Diagnosis Date Noted    [facility] [address]     Acute osteomyelitis of toe of left foot ([facility] [address]     Cellulitis of left foot     Gangrene of toe of left foot ([facility] [address] 2018       Past Medical History:   Diagnosis Date    Chronic back pain     Psoriasis     Scoliosis      Past Surgical History:   Procedure Laterality Date     SECTION      MA VEIN BYPASS GRAFT,FEM-POP Left 2018    LEFT LEG SUPER FEMORAL POSTERIOR TIBIAL BYPASS, REVERSE SAPHENEOUS VEIN GRAFT , HARVEST RIGHT, COMPLETION ANGIOGRAM,  INFUSION NITROGLYCERIN APPLICATION OF SOFT CAST LEFT LEG performed by Vaughn Chavez MD at [facility]         Restrictions  Restrictions/Precautions  Restrictions/Precautions: General Precautions, Fall Risk, Weight Bearing  Lower Extremity Weight Bearing Restrictions  Left Lower Extremity Weight Bearing: Weight Bearing As Tolerated  Partial Weight Bearing Percentage Or Pounds: \"Walk on foot as tolerated\" per vascular note filed 18 at 7:25am.  Position Activity Restriction  Other position/activity restrictions: activity as tolerated  Subjective    Pt sitting on commode upon entry. Pt had no c/o pain   Orientation    Overall Orientation Status: Within Functional Limits  Objective     Transfers  Sit to Stand: Moderate Assistance (face to face.) [facility]  Stand to sit: Moderate Assistance;Contact guard assistance  Ambulation  Ambulation?: Yes  Ambulation 1  Surface: level tile  Device:  PF RW  Assistance: Min. assistance  Quality of Gait: Analgic gait,safe step length,slower pace d/t pain with WB. Pt able to maintain upright with hip/ knee extension. Distance: 300' with two standing rest breaks. Pt tearful at times.     Stairs/Curb  Stairs?: No Balance  Posture: Good  Sitting - Static: Good  Sitting - Dynamic: Good  Standing - Static: Fair  Standing - Dynamic: Fair   Assessment     Body structures, Functions, Activity limitations: Decreased functional mobility ; Decreased balance  Assessment: amb 300 Liz with PF RW. Pt educated on on how to ease her wt with her UE's when stepping through with her L LE. Pt able to extend hips/ knees to maintain upright and progress LEs.    Prognosis: Good  Clinical Presentation: PT POC  REQUIRES PT FOLLOW UP: Yes  Activity Tolerance  Activity Tolerance: Patient limited by pain  PT Equipment Recommendations  Other: TBD     Goals  Short term goals  Time Frame for Short term goals: 14 visits  Short term goal 1: Pt will be I with bed mobility  Short term goal 2: Pt will be Adama with transfers  Short term goal 3: Pt will amb 150' Adama with least restrictive AD  Short term goal 4: Pt will navigate 4 steps Adama with R rail use only  Patient Goals   Patient goals : Pt would like to feel better    Plan      Plan  Times per week: 6-7x/wk  Current Treatment Recommendations: Strengthening, Transfer Training, Endurance Training, Balance Training, Gait Training, Stair training, Functional Mobility Training, Safety Education & Training, Patient/Caregiver Education & Training, Equipment Evaluation, Education, & procurement  Safety Devices  Type of devices: Call light within reach, Left in chair, Nurse notified, Patient at risk for falls, Gait belt  Restraints  Initially in place: No      Therapy Time   Individual Concurrent Group Co-treatment   Time In  915         Time Out  945         Minutes  30                 JEIMY [facility]

## 2018-02-09 VITALS
WEIGHT: 114.42 LBS | SYSTOLIC BLOOD PRESSURE: 105 MMHG | OXYGEN SATURATION: 96 % | BODY MASS INDEX: 23.07 KG/M2 | HEART RATE: 102 BPM | DIASTOLIC BLOOD PRESSURE: 75 MMHG | TEMPERATURE: 97.7 F | RESPIRATION RATE: 12 BRPM | HEIGHT: 59 IN

## 2018-02-09 PROBLEM — Z95.828: Status: ACTIVE | Noted: 2018-02-09

## 2018-02-09 PROBLEM — G89.18 POST-OP PAIN: Status: ACTIVE | Noted: 2018-02-09

## 2018-02-09 PROBLEM — F06.30 MOOD DISORDER DUE TO A GENERAL MEDICAL CONDITION: Status: ACTIVE | Noted: 2018-02-09

## 2018-02-09 LAB
EKG ATRIAL RATE: 117 BPM
EKG ATRIAL RATE: 76 BPM
EKG P AXIS: 55 DEGREES
EKG P AXIS: 60 DEGREES
EKG P-R INTERVAL: 112 MS
EKG P-R INTERVAL: 114 MS
EKG Q-T INTERVAL: 326 MS
EKG Q-T INTERVAL: 380 MS
EKG QRS DURATION: 82 MS
EKG QRS DURATION: 84 MS
EKG QTC CALCULATION (BAZETT): 427 MS
EKG QTC CALCULATION (BAZETT): 454 MS
EKG R AXIS: 56 DEGREES
EKG R AXIS: 57 DEGREES
EKG T AXIS: 35 DEGREES
EKG T AXIS: 59 DEGREES
EKG VENTRICULAR RATE: 117 BPM
EKG VENTRICULAR RATE: 76 BPM

## 2018-02-09 PROCEDURE — 6370000000 HC RX 637 (ALT 250 FOR IP): Performed by: STUDENT IN AN ORGANIZED HEALTH CARE EDUCATION/TRAINING PROGRAM

## 2018-02-09 PROCEDURE — 94762 N-INVAS EAR/PLS OXIMTRY CONT: CPT

## 2018-02-09 PROCEDURE — 6360000002 HC RX W HCPCS: Performed by: SURGERY

## 2018-02-09 PROCEDURE — 6370000000 HC RX 637 (ALT 250 FOR IP): Performed by: SURGERY

## 2018-02-09 PROCEDURE — 97530 THERAPEUTIC ACTIVITIES: CPT

## 2018-02-09 PROCEDURE — 97166 OT EVAL MOD COMPLEX 45 MIN: CPT | Performed by: OCCUPATIONAL THERAPIST

## 2018-02-09 PROCEDURE — 97116 GAIT TRAINING THERAPY: CPT

## 2018-02-09 PROCEDURE — 97535 SELF CARE MNGMENT TRAINING: CPT | Performed by: OCCUPATIONAL THERAPIST

## 2018-02-09 PROCEDURE — 99232 SBSQ HOSP IP/OBS MODERATE 35: CPT | Performed by: INTERNAL MEDICINE

## 2018-02-09 PROCEDURE — G8988 SELF CARE GOAL STATUS: HCPCS | Performed by: OCCUPATIONAL THERAPIST

## 2018-02-09 PROCEDURE — G8987 SELF CARE CURRENT STATUS: HCPCS | Performed by: OCCUPATIONAL THERAPIST

## 2018-02-09 RX ORDER — OXYCODONE HYDROCHLORIDE AND ACETAMINOPHEN 5; 325 MG/1; MG/1
1 TABLET ORAL EVERY 6 HOURS PRN
Qty: 28 TABLET | Refills: 0 | Status: SHIPPED | OUTPATIENT
Start: 2018-02-09 | End: 2018-02-09 | Stop reason: HOSPADM

## 2018-02-09 RX ORDER — DOXYCYCLINE HYCLATE 100 MG
100 TABLET ORAL 2 TIMES DAILY
Qty: 28 TABLET | Refills: 0 | Status: SHIPPED | OUTPATIENT
Start: 2018-02-09 | End: 2018-02-21 | Stop reason: ALTCHOICE

## 2018-02-09 RX ORDER — ASPIRIN 81 MG/1
81 TABLET ORAL DAILY
Qty: 30 TABLET | Refills: 3 | Status: SHIPPED | OUTPATIENT
Start: 2018-02-10

## 2018-02-09 RX ADMIN — DOCUSATE SODIUM 100 MG: 100 CAPSULE ORAL at 10:34

## 2018-02-09 RX ADMIN — MORPHINE SULFATE 1 MG: 2 INJECTION, SOLUTION INTRAMUSCULAR; INTRAVENOUS at 08:28

## 2018-02-09 RX ADMIN — ASPIRIN 81 MG: 81 TABLET, COATED ORAL at 10:34

## 2018-02-09 RX ADMIN — OXYCODONE HYDROCHLORIDE AND ACETAMINOPHEN 2 TABLET: 5; 325 TABLET ORAL at 16:19

## 2018-02-09 RX ADMIN — KETOROLAC TROMETHAMINE 15 MG: 15 INJECTION, SOLUTION INTRAMUSCULAR; INTRAVENOUS at 05:55

## 2018-02-09 RX ADMIN — OXYCODONE HYDROCHLORIDE AND ACETAMINOPHEN 2 TABLET: 5; 325 TABLET ORAL at 10:37

## 2018-02-09 RX ADMIN — THERA TABS 1 TABLET: TAB at 10:34

## 2018-02-09 ASSESSMENT — PAIN SCALES - GENERAL
PAINLEVEL_OUTOF10: 4
PAINLEVEL_OUTOF10: 8
PAINLEVEL_OUTOF10: 8
PAINLEVEL_OUTOF10: 5
PAINLEVEL_OUTOF10: 7
PAINLEVEL_OUTOF10: 8

## 2018-02-09 ASSESSMENT — PAIN DESCRIPTION - ORIENTATION
ORIENTATION: RIGHT;LEFT
ORIENTATION: RIGHT;LEFT

## 2018-02-09 ASSESSMENT — PAIN DESCRIPTION - PAIN TYPE
TYPE: SURGICAL PAIN
TYPE: SURGICAL PAIN

## 2018-02-09 ASSESSMENT — PAIN DESCRIPTION - LOCATION
LOCATION: LEG
LOCATION: LEG

## 2018-02-09 NOTE — PROGRESS NOTES
Standard  Bathroom Equipment: Grab bars in shower (Pt reports will have shower chair upon return home)  Home Equipment:  (surgical shoe)  ADL Assistance: Independent  Homemaking Assistance: Independent  Homemaking Responsibilities: Yes  Meal Prep Responsibility: Primary  Laundry Responsibility: Primary  Cleaning Responsibility: Primary  Shopping Responsibility: Primary  Dependent Care Responsibility: Primary  Ambulation Assistance: Independent  Transfer Assistance: Independent  Active : Yes  Patient's  Info:  drives  Mode of Transportation: Family  Occupation: Unemployed  Leisure & Hobbies: Pt states she typically likes to ride horses, camp and fish. IADL Comments: Pt reports her  works but is able to assist her as needed when he is home. Objective   Vision: Within Functional Limits  Hearing: Within functional limits    Orientation  Overall Orientation Status: Within Functional Limits     Balance  Sitting Balance: Independent  Standing Balance: Contact guard assistance  Standing Balance  Time: ~5 min  Activity: functional mobility in room  Sit to stand: Stand by assistance  Stand to sit: Stand by assistance  Functional Mobility  Functional - Mobility Device: No device  Activity: To/from bathroom  Assist Level: Contact guard assistance  Functional Mobility Comments: Pt with one posterior LOB, required min A to correct  Toilet Transfers  Toilet - Technique: Ambulating  Equipment Used: Standard toilet  Toilet Transfer: Contact guard assistance  ADL  Feeding: Independent  Grooming: Independent;Setup  UE Bathing: Independent;Setup  LE Bathing: Setup; Moderate assistance  UE Dressing: Setup;Minimal assistance (to tie gown in back)  LE Dressing: Maximum assistance (to don footie/surgical boot)  Toileting: Supervision        Bed mobility  Supine to Sit: Modified independent  Sit to Supine: Modified independent  Scooting: Independent  Transfers  Sit to stand: Stand by assistance  Stand to sit: Stand by assistance     Cognition  Overall Cognitive Status: WFL        Sensation  Overall Sensation Status: Impaired (Pt reports numbness and tingling in bilateral LE ~thigh down)        LUE AROM (degrees)  LUE AROM : WFL  Left Hand AROM (degrees)  Left Hand AROM: WFL  RUE AROM (degrees)  RUE AROM : WFL  Right Hand AROM (degrees)  Right Hand AROM: WFL  LUE Strength  Gross LUE Strength: WFL  RUE Strength  Gross RUE Strength: WFL     Assessment   Performance deficits / Impairments: Decreased functional mobility ; Decreased ADL status; Decreased endurance;Decreased balance  Prognosis: Good  Decision Making: Medium Complexity  Patient Education: Safety with mobility, shower safety  REQUIRES OT FOLLOW UP: Yes  Activity Tolerance  Activity Tolerance: Patient Tolerated treatment well  Safety Devices  Safety Devices in place: Yes  Type of devices: Gait belt;Left in bed;Call light within reach;Nurse notified  Restraints  Initially in place: No  OT Equipment Recommendations  Equipment Needed: Yes  Mobility Devices: ADL Assistive Devices  ADL Assistive Devices: Transfer Tub Bench           Plan   Plan  Times per week: 4-5x  Current Treatment Recommendations: Endurance Training, Functional Mobility Training, Balance Training, Self-Care / ADL, Equipment Evaluation, Education, & procurement, Safety Education & Training, Patient/Caregiver Education & Training    G-Code  OT G-codes  Functional Assessment Tool Used: [place] AMPA  Score: 19  Functional Limitation: Self care  Self Care Current Status (): At least 40 percent but less than 60 percent impaired, limited or restricted  Self Care Goal Status (): At least 20 percent but less than 40 percent impaired, limited or restricted    Goals  Short term goals  Time Frame for Short term goals: By discharge pt will. [place] term goal 1: demo LB dressing (donning/doffing surgical shoe) with min A  Short term goal 2: demo safety with functional mobility with SBA  Short term goal

## 2018-02-09 NOTE — PROGRESS NOTES
Division of Vascular Surgery             Progress Note      Name: Parag Lloyd  MRN: [id number]         Overnight Events:     No acute overnight events    Subjective:   POD #4 s/p Left SFA to PT artery bypass with reversed GSV graft (right). Pt seen and examined at bedside. Relates improved bilateral leg pain since yesterday. Rash has improved and she is less itchy. Pt was able to tolerated general diet yesterday without nausea or vomiting. She ambulated with PT yesterday. +flatus, Last BM was before OR. Physical Exam:     Vitals:  /75   Pulse 102   Temp 97.7 °F (36.5 °C) (Oral)   Resp 12   Ht 4' 11\" (1.499 m)   Wt 114 lb 6.7 oz (51.9 kg)   SpO2 96%   BMI 23.11 kg/m²     General appearance - asleep resting comfortably, easy to awaken, well appearing and in no acute distress  Mental status - oriented to person, place and time with normal affect  Head - normocephalic and atraumatic  Chest - no increased WOB  Heart - normal rate, regular rhythm  Abdomen - soft, non-tender, non-distended  Extremities - peripheral UE pulses palpable. LLE w/ ace wrap and soft cast in place. Mild edema and erythema to upper thigh   Skin - Multiple scaling plaques consistent with pt h/o psoriasis noted to b/l LE and UE. Foot Exam - L great toe w/ continued dry eschar. Severe pain to palpation of left hallux. Vascular Exam  - Right DP/PT palpable. Patchy areas of scaling and erythema (psoriasis)  - Left DP/PT non-palpable, PT doppler signal through portals in dressing. Left hallux toenail absent with overlying hyperkeratotic dry necrotic tissue, tender to palpation. Minimal serous drainage to dorsum of left hallux IPJ. No malodor noted. Dorsum of left foot +edema/erythema extending to level of ankle (improving, psoriasis), mild TTP. No crepitus or induration noted.     Radiology:  US L thigh: 2/7  Summary      Simultaneous real time imaging utilizing B-Mode, color doppler and   spectral waveform analysis was performed on the right lower extremity for   venous examination of the deep and superficial systems. Findings are:      Right:   No evidence of deep or superficial venous thrombosis.   No hematoma identified. Assessment:     Jazmin Price is a 30 y.o. female who     1. Rest pain and ischemia of the left lower extremity due to likely heavy atherosclerotic disease advanced for her age  3. s/p Left SFA to PT artery bypass with reversed GSV graft (right)  3. Smoking addiction  4. Osteomyelitis left great toe distal bone  5. 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 Psoriasis    Plan:     1. Continue medical mgmt and supportive care  2. Smoking cessation counseling to prevent accelation of disease. 3. Diet: General  4. Analgesia: Morphine 1 mg Q3H PRN and percocet 1-2Q4H PRN  5. Encourage PO intake w/ pain meds  6. Activity: OOB ok to walk on foot as tolerated  7. Wound care:  Continue w/ bilat compression wraps. BID dressing changes w/ betadine to L great toe. John leg dressings will be changed today by vascular surgery today. 8. Anticoagulation:   Held due to concern for R thigh hematoma. Will discuss restarting and transition to PO.   9. ID following:  Feels rash secondary to allergic rxn not related to Abx. Paper sheets/gown. 10. Abx:   Held at this time secondary to significant rash. Off abx x day #3. Will re-eval and restart if needed. 11. Zofran PRN nausea  12. Discontinue w/ IVF at 50 ml/H pt tolerating PO without nausea. 13. PT/OT  14. Psych consult:  Appears depressed w/ known h/o anxiety and depression. Smoking addiction.     Electronically signed by Cinthya Wong DPM on 2/9/2018 at 6:59 AM

## 2018-02-09 NOTE — PROGRESS NOTES
Physical Therapy  Facility/Department: [facility] CAR 1  Daily Treatment Note  NAME: Ra Bai  : 1980  MRN: [id number]    Date of Service: 2018    Patient Diagnosis(es):   Patient Active Problem List    Diagnosis Date Noted    [facility] [address]     Acute osteomyelitis of toe of left foot ([facility] [address]     Cellulitis of left foot     Gangrene of toe of left foot ([facility] [address] 2018       Past Medical History:   Diagnosis Date    Chronic back pain     Psoriasis     Scoliosis      Past Surgical History:   Procedure Laterality Date     SECTION      DE VEIN BYPASS GRAFT,FEM-POP Left 2018    LEFT LEG SUPER FEMORAL POSTERIOR TIBIAL BYPASS, REVERSE SAPHENEOUS VEIN GRAFT , HARVEST RIGHT, COMPLETION ANGIOGRAM,  INFUSION NITROGLYCERIN APPLICATION OF SOFT CAST LEFT LEG performed by Eric Coelho MD at [facility]         Restrictions  Restrictions/Precautions  Restrictions/Precautions: General Precautions, Fall Risk, Weight Bearing  Lower Extremity Weight Bearing Restrictions  Left Lower Extremity Weight Bearing: Weight Bearing As Tolerated  Partial Weight Bearing Percentage Or Pounds: \"Walk on foot as tolerated\" per vascular note filed 18 at 7:25am.  Position Activity Restriction  Other position/activity restrictions: activity as tolerated  Subjective    Pt sitting on her bed upon entry. Pt's Mother is present. Pt had no c/o pain   Orientation    Overall Orientation Status: Within Functional Limits  Objective     Transfers  Sit to Stand:CGA  Stand to sit: CGA  Ambulation  Ambulation?: Yes  Ambulation 1  Surface: level tile  Device:  No Device  Assistance: Min. assistance  Quality of Gait: Analgic gait,safe step length,slower pace d/t pain with WB. Pt able to maintain upright with hip/ knee extension. Distance: 300' with three standing rest breaks.      Stairs/Curb  Stairs?: No     Balance  Posture: Good  Sitting - Static: Good  Sitting - Dynamic: Good  Standing - Static: Fair  Standing - Dynamic: Fair   Assessment     Body structures, Functions, Activity limitations: Decreased functional mobility ; Decreased balance  Assessment: amb 300' CGA with no device. Pt's dressing became lose on her L LE and came off her L toe. RN informed.   Prognosis: Good  Clinical Presentation: PT POC  REQUIRES PT FOLLOW UP: Yes  Activity Tolerance  Activity Tolerance: Patient limited by pain  PT Equipment Recommendations  Other: TBD     Goals  Short term goals  Time Frame for Short term goals: 14 visits  Short term goal 1: Pt will be I with bed mobility  Short term goal 2: Pt will be Adama with transfers  Short term goal 3: Pt will amb 150' Adama with least restrictive AD  Short term goal 4: Pt will navigate 4 steps Adama with R rail use only  Patient Goals   Patient goals : Pt would like to feel better    Plan      Plan  Times per week: 6-7x/wk  Current Treatment Recommendations: Strengthening, Transfer Training, Endurance Training, Balance Training, Gait Training, Stair training, Functional Mobility Training, Safety Education & Training, Patient/Caregiver Education & Training, Equipment Evaluation, Education, & procurement  Safety Devices  Type of devices: Call light within reach, Left in chair, Nurse notified, Patient at risk for falls, Gait belt  Restraints  Initially in place: No      Therapy Time   Individual Concurrent Group Co-treatment   Time In   1030         Time Out   1100         Minutes  30                 [place], [place]

## 2018-02-09 NOTE — PLAN OF CARE
Problem: Pain:  Goal: Pain level will decrease  Pain level will decrease   Outcome: Ongoing    Goal: Control of acute pain  Control of acute pain   Outcome: Ongoing      Problem: Falls - Risk of  Goal: Absence of falls  Outcome: Ongoing      Problem: Musculor/Skeletal Functional Status  Goal: Highest potential functional level  Outcome: Ongoing      Problem: Nutrition  Goal: Optimal nutrition therapy  Outcome: Ongoing      Problem: Risk for Impaired Skin Integrity  Goal: Tissue integrity - skin and mucous membranes  Structural intactness and normal physiological function of skin and  mucous membranes.    Outcome: Ongoing

## 2018-02-09 NOTE — FLOWSHEET NOTE
Patient and her mother offered home care services for dressing changes multiple times. Encouraged the patient to utilize these services as her mother will be returning to [place] and it is not clear the level of assistance she will receive at home. The patient has declined so far. Will check back later.

## 2018-02-09 NOTE — CONSULTS
Ideations: denies  to  Homicide Ideations:denies  Orientation: person place time   Intelligence:  Memory: recent and remote memory intact,       DIAGNOSTIC IMPRESSION      Mood disorder due to a general medical condition         Treatment Plan    1. Psych medications reviewed with patient and continue and discussed with pt about counseling if needed   2  OK to Discharge to ECF/HOME when medically stable         docusate sodium  100 mg Oral Daily    bisacodyl  10 mg Rectal Once    aspirin  81 mg Oral Daily    nicotine  1 patch Transdermal Daily    ketorolac  15 mg Intravenous Q6H    multivitamin  1 tablet Oral Daily    sertraline  25 mg Oral Nightly    sodium chloride flush  10 mL Intravenous 2 times per day     oxyCODONE-acetaminophen **OR** oxyCODONE-acetaminophen, hydrOXYzine, butalbital-acetaminophen-caffeine, sodium chloride flush, magnesium hydroxide, ondansetron    Thanks for the consult.     Demi Rendon MD  Psychiatrist.

## 2018-02-10 ASSESSMENT — ENCOUNTER SYMPTOMS
RESPIRATORY NEGATIVE: 1
COLOR CHANGE: 0
ALLERGIC/IMMUNOLOGIC NEGATIVE: 1
EYES NEGATIVE: 1
GASTROINTESTINAL NEGATIVE: 1
DIARRHEA: 0

## 2018-02-10 NOTE — PROGRESS NOTES
Penicillins     Review of Systems:     Review of Systems   Constitutional:        Resolved itching and rash   HENT: Negative. Eyes: Negative. Redness resolved    Respiratory: Negative. Cardiovascular: Negative. Gastrointestinal: Negative. Negative for diarrhea. Had nausea but controlled at present    Endocrine: Negative. Musculoskeletal:        Left foot and leg pain   Skin: Negative for color change and rash. Itching resolved  Psoriasis old rash present   Allergic/Immunologic: Negative. Hematological: Negative. Psychiatric/Behavioral:        Pain improved       Physical Examination :     Patient Vitals for the past 24 hrs:   BP Temp Temp src Pulse Resp SpO2 Weight   02/09/18 0624 105/75 97.7 °F (36.5 °C) Oral 102 12 96 % -   02/09/18 0500 - - - - - - 114 lb 6.7 oz (51.9 kg)   02/09/18 0350 116/80 - - 106 16 98 % -        Physical Exam   Constitutional: She is oriented to person, place, and time. No distress. Pain post OP   HENT:   Head: Normocephalic and atraumatic. Eyes: Conjunctivae and EOM are normal. Right eye exhibits no discharge. Left eye exhibits no discharge. No scleral icterus. Neck: Normal range of motion. No tracheal deviation present. Cardiovascular: Normal rate and regular rhythm. Exam reveals no gallop and no friction rub. Pulmonary/Chest: Effort normal and breath sounds normal. She has no rales. Abdominal: Soft. Bowel sounds are normal. She exhibits no distension. Musculoskeletal: Normal range of motion. She exhibits no edema. Left hallux tip necrotic scab   Neurological: She is oriented to person, place, and time. No cranial nerve deficit. Sleepy arousable from pain   Skin: Skin is warm and dry. No rash noted. She is not diaphoretic. There is erythema.    Left hallux covered w psoriasis rash  And allergic rash resolved   Psychiatric: Affect and judgment normal.         Medical Decision Making:   I have independently reviewed/ordered the following labs:    CBC with Differential:   No results for input(s): WBC, HGB, HCT, PLT, SEGSPCT, BANDSPCT, LYMPHOPCT, MONOPCT, EOSPCT in the last 72 hours. BMP:   No results for input(s): NA, K, CL, CO2, BUN, CREATININE, MG in the last 72 hours. Invalid input(s): CA  Hepatic Function Panel:   No results for input(s): PROT, LABALBU, BILIDIR, IBILI, BILITOT, ALKPHOS, ALT, AST in the last 72 hours. No results for input(s): RPR in the last 72 hours. No results for input(s): HIV in the last 72 hours. No results for input(s): BC in the last 72 hours. Lab Results   Component Value Date    MUCUS NOT REPORTED 02/05/2018    RBC 3.11 02/06/2018    TRICHOMONAS NOT REPORTED 02/05/2018    WBC 13.4 02/06/2018    YEAST NOT REPORTED 02/05/2018    TURBIDITY CLEAR 02/05/2018     Lab Results   Component Value Date    CREATININE 0.32 02/06/2018    GLUCOSE 91 02/06/2018       Detailed results: Thank you for allowing us to participate in the care of this patient. Please call with questions.     Genoveva Monroy MD  Office: [phone]

## 2018-02-12 ENCOUNTER — TELEPHONE (OUTPATIENT)
Dept: INFECTIOUS DISEASES | Age: 38
End: 2018-02-12

## 2018-02-12 ENCOUNTER — OFFICE VISIT (OUTPATIENT)
Dept: INFECTIOUS DISEASES | Age: 38
End: 2018-02-12
Payer: COMMERCIAL

## 2018-02-12 ENCOUNTER — HOSPITAL ENCOUNTER (OUTPATIENT)
Age: 38
Setting detail: SPECIMEN
Discharge: HOME | End: 2018-02-12
Payer: COMMERCIAL

## 2018-02-12 VITALS
WEIGHT: 110.2 LBS | OXYGEN SATURATION: 97 % | SYSTOLIC BLOOD PRESSURE: 100 MMHG | HEART RATE: 84 BPM | DIASTOLIC BLOOD PRESSURE: 66 MMHG | TEMPERATURE: 99.2 F | BODY MASS INDEX: 22.22 KG/M2 | RESPIRATION RATE: 16 BRPM | HEIGHT: 59 IN

## 2018-02-12 DIAGNOSIS — L08.9 LEFT FOOT INFECTION: Primary | ICD-10-CM

## 2018-02-12 DIAGNOSIS — I96 GANGRENE OF TOE OF LEFT FOOT (HCC): ICD-10-CM

## 2018-02-12 PROCEDURE — 4004F PT TOBACCO SCREEN RCVD TLK: CPT | Performed by: INTERNAL MEDICINE

## 2018-02-12 PROCEDURE — G8484 FLU IMMUNIZE NO ADMIN: HCPCS | Performed by: INTERNAL MEDICINE

## 2018-02-12 PROCEDURE — G8420 CALC BMI NORM PARAMETERS: HCPCS | Performed by: INTERNAL MEDICINE

## 2018-02-12 PROCEDURE — 99214 OFFICE O/P EST MOD 30 MIN: CPT | Performed by: INTERNAL MEDICINE

## 2018-02-12 PROCEDURE — 1111F DSCHRG MED/CURRENT MED MERGE: CPT | Performed by: INTERNAL MEDICINE

## 2018-02-12 PROCEDURE — G8427 DOCREV CUR MEDS BY ELIG CLIN: HCPCS | Performed by: INTERNAL MEDICINE

## 2018-02-12 NOTE — TELEPHONE ENCOUNTER
Pt D/C from  on Doxy- infected toe-  Pt called to schedule her 2 wk f/u - she is using [facility], however she is reporting it's starting to pus, and this was a concern you had while she was in the hospital.  Please advise.

## 2018-02-12 NOTE — PROGRESS NOTES
were changed, antibiotic stopped, and the sheaths were turned into paper. She comes in for follow-up, her rash continues to be resolved, the left hallux tip gangrene is the same, but seems to have developed a little bit of moisture on the edges, probably related to the increased vascularization. No signs of active cellulitis and psoriasis continues to cover the dorsum of the foot. Otherwise, her upper leg and lower leg. Surgical wounds are all clean, wheezing, some serous fluid from the left ankle surgical wound and some bloody discharge from the thigh wound. No active signs of infection. No purulence. No cellulitis. I removed and replaced the dressing in the office, using Betadine on the hallux again. I took a culture from the wintertime around the gangrenous tip. I have personally reviewed the past medical history, past surgical history, medications, social history, and family history, and I have updated the database accordingly.   Past Medical History:     Past Medical History:   Diagnosis Date    Chronic back pain     Psoriasis     Scoliosis        Past Surgical  History:     Past Surgical History:   Procedure Laterality Date     SECTION      NM VEIN BYPASS GRAFT,FEM-POP Left 2018    LEFT LEG SUPER FEMORAL POSTERIOR TIBIAL BYPASS, REVERSE SAPHENEOUS VEIN GRAFT , HARVEST RIGHT, COMPLETION ANGIOGRAM,  INFUSION NITROGLYCERIN APPLICATION OF SOFT CAST LEFT LEG performed by Leonard Vieira MD at [facility]         Medications:     Current Outpatient Prescriptions:     aspirin 81 MG EC tablet, Take 1 tablet by mouth daily, Disp: 30 tablet, Rfl: 3    nicotine (NICODERM CQ) 7 MG/24HR, Place 1 patch onto the skin daily, Disp: 30 patch, Rfl: 1    doxycycline hyclate (VIBRA-TABS) 100 MG tablet, Take 1 tablet by mouth 2 times daily for 14 days, Disp: 28 tablet, Rfl: 0    clopidogrel (PLAVIX) 75 MG tablet, Take 1 tablet by mouth daily, Disp: 30 tablet, Rfl: 0   gabapentin (NEURONTIN) 300 MG capsule, Take 300 mg by mouth 3 times daily. , Disp: , Rfl:     ibuprofen (ADVIL;MOTRIN) 800 MG tablet, Take 800 mg by mouth every 6 hours as needed for Pain, Disp: , Rfl:     clobetasol (TEMOVATE) 0.05 % ointment, Apply topically 2 times daily. , Disp: 100 g, Rfl: 2    sertraline (ZOLOFT) 25 MG tablet, Take 1 tablet by mouth daily. , Disp: 30 tablet, Rfl: 6      Social History:     Social History     Social History    Marital status: Single     Spouse name: N/A    Number of children: N/A    Years of education: N/A     Occupational History    Not on file. Social History Main Topics    Smoking status: Current Every Day Smoker     Packs/day: 1.00     Years: 15.00    Smokeless tobacco: Never Used    Alcohol use No    Drug use: Yes    Sexual activity: Yes     Partners: Male     Other Topics Concern    Not on file     Social History Narrative    No narrative on file       Family History:     Family History   Problem Relation Age of Onset    Cancer Mother     Diabetes Maternal Grandmother     Stroke Maternal Grandmother         Allergies:   Penicillins     Review of Systems:   Review of Systems   Constitutional: Negative. HENT: Negative. Eyes: Negative. Respiratory: Negative. Cardiovascular: Negative. Gastrointestinal: Negative. Endocrine: Negative. Genitourinary: Negative. Musculoskeletal: Negative. Skin: Positive for wound. Left hallux tip gangrene   Allergic/Immunologic: Negative. Neurological: Negative. Hematological: Negative. Psychiatric/Behavioral: Negative. Physical Examination :   Blood pressure 100/66, pulse 84, temperature 99.2 °F (37.3 °C), resp. rate 16, height 4' 11\" (1.499 m), weight 110 lb 3.2 oz (50 kg), SpO2 97 %. Physical Exam   Constitutional: She is oriented to person, place, and time. She appears well-developed and well-nourished. No distress. HENT:   Head: Normocephalic and atraumatic.

## 2018-02-14 ENCOUNTER — HOSPITAL ENCOUNTER (OUTPATIENT)
Dept: VASCULAR LAB | Facility: CLINIC | Age: 38
Discharge: HOME | End: 2018-02-14
Payer: COMMERCIAL

## 2018-02-14 DIAGNOSIS — Z95.828 STATUS POST BYPASS GRAFT OF EXTREMITY: ICD-10-CM

## 2018-02-14 PROCEDURE — 93926 LOWER EXTREMITY STUDY: CPT

## 2018-02-15 LAB
CULTURE: ABNORMAL
CULTURE: ABNORMAL
DIRECT EXAM: ABNORMAL
DIRECT EXAM: ABNORMAL
Lab: ABNORMAL
ORGANISM: ABNORMAL
SPECIMEN DESCRIPTION: ABNORMAL
STATUS: ABNORMAL

## 2018-02-21 ENCOUNTER — OFFICE VISIT (OUTPATIENT)
Dept: INFECTIOUS DISEASES | Age: 38
End: 2018-02-21
Payer: COMMERCIAL

## 2018-02-21 VITALS
OXYGEN SATURATION: 98 % | SYSTOLIC BLOOD PRESSURE: 99 MMHG | RESPIRATION RATE: 16 BRPM | HEART RATE: 85 BPM | BODY MASS INDEX: 22.38 KG/M2 | WEIGHT: 111 LBS | TEMPERATURE: 99 F | HEIGHT: 59 IN | DIASTOLIC BLOOD PRESSURE: 68 MMHG

## 2018-02-21 DIAGNOSIS — I96 GANGRENE OF TOE OF RIGHT FOOT (HCC): ICD-10-CM

## 2018-02-21 DIAGNOSIS — M86.9 TOE OSTEOMYELITIS, RIGHT (HCC): Primary | ICD-10-CM

## 2018-02-21 PROCEDURE — G8420 CALC BMI NORM PARAMETERS: HCPCS | Performed by: INTERNAL MEDICINE

## 2018-02-21 PROCEDURE — G8428 CUR MEDS NOT DOCUMENT: HCPCS | Performed by: INTERNAL MEDICINE

## 2018-02-21 PROCEDURE — 1111F DSCHRG MED/CURRENT MED MERGE: CPT | Performed by: INTERNAL MEDICINE

## 2018-02-21 PROCEDURE — 1036F TOBACCO NON-USER: CPT | Performed by: INTERNAL MEDICINE

## 2018-02-21 PROCEDURE — G8484 FLU IMMUNIZE NO ADMIN: HCPCS | Performed by: INTERNAL MEDICINE

## 2018-02-21 PROCEDURE — 99214 OFFICE O/P EST MOD 30 MIN: CPT | Performed by: INTERNAL MEDICINE

## 2018-02-21 RX ORDER — LEVOFLOXACIN 500 MG/1
500 TABLET, FILM COATED ORAL DAILY
Qty: 14 TABLET | Refills: 0 | Status: SHIPPED | OUTPATIENT
Start: 2018-02-21 | End: 2018-03-07

## 2018-02-21 RX ORDER — BETAMETHASONE DIPROPIONATE 0.5 MG/G
CREAM TOPICAL 2 TIMES DAILY
COMMUNITY

## 2018-02-21 RX ORDER — HYDROCODONE BITARTRATE AND ACETAMINOPHEN 5; 325 MG/1; MG/1
1 TABLET ORAL EVERY 6 HOURS PRN
COMMUNITY

## 2018-02-21 NOTE — PROGRESS NOTES
[facility] - Initial Consult Note  Today's Date and Time: 2.21.18    Impression :   · Left hallux tip osteomyelitis and gangrene  · Arterial vascular disease, lower extremities, post left lower extremity arterial bypass using saphenous veins. February 2018  · Active smoking until February 2018  · Psoriasis with skin rash    Recommendations   · Follow-up with vascular surgery, might need amputation of the distal right first phalanx. · Patient and  had multiple questions about possible peripheral vascular disease affecting other vessels including the right arm as well as the neck vessels. Defer that to vascular upon next visit. · Stop the doxycycline and start Levaquin 500 milligram by mouth daily and watch for allergic reactions since she had the skin rash in the hospital and we never found an etiology. .  · Continue Betadine on the toe and keep it dry  ·   1. Toe osteomyelitis, right (HCC)  levofloxacin (LEVAQUIN) 500 MG tablet   2. Gangrene of toe of right foot (HCC)  levofloxacin (LEVAQUIN) 500 MG tablet       Return in about 2 weeks (around 3/7/2018). If issues with the toe earlier, she can call me and I could see her next week    History of Present Illness:   Kellee Chin is a 30y.o.-year-old  female who presents with   Chief Complaint   Patient presents with    Other     2 week FU   Visit of 2/12/18  This is a patient with a left hallux tip necrosis and osteomyelitis of the first distal phalanx, seen in February 2018 at [facility], with peripheral vascular disease and extreme pain to the hallux. She has overlying psoriasis rash affecting the left foot dorsum. During her admission, she had a bypass to the left lower extremity using saphenous vein. This lead to better oxygenation of the foot and improvement of the numbness. Pulses became better.   During her stay in the hospital, she develops a medical papular rash that was confluent and 02/06/2018    CL 99 02/03/2018    CO2 28 02/06/2018    CO2 26 02/03/2018    BUN 9 02/06/2018    BUN 12 02/03/2018    CREATININE 0.32 02/06/2018    CREATININE 0.35 02/03/2018     Hepatic Function Panel:   Lab Results   Component Value Date    PROT 7.3 02/01/2018    LABALBU 3.8 02/01/2018    BILIDIR <0.08 02/01/2018    IBILI CANNOT BE CALCULATED 02/01/2018    BILITOT <0.10 02/01/2018    ALKPHOS 76 02/01/2018    ALT 17 02/01/2018    AST 18 02/01/2018     No results found for: RPR  No results found for: HIV  No results found for: [facility]  Lab Results   Component Value Date    MUCUS NOT REPORTED 02/05/2018    RBC 3.11 02/06/2018    TRICHOMONAS NOT REPORTED 02/05/2018    WBC 13.4 02/06/2018    YEAST NOT REPORTED 02/05/2018    TURBIDITY CLEAR 02/05/2018     Lab Results   Component Value Date    CREATININE 0.32 02/06/2018    GLUCOSE 91 02/06/2018     Thank you for allowing us to participate in the care of this patient. Please call with questions. Edwige Lewis MD  - Office: [phone]    Please note that this chart was generated using voice recognition Dragon dictation software. Although every effort was made to ensure the accuracy of this automated transcription, some errors in transcription may have occurred.

## 2018-02-22 ENCOUNTER — TELEPHONE (OUTPATIENT)
Dept: PULMONOLOGY | Age: 38
End: 2018-02-22

## 2018-02-26 ENCOUNTER — TELEPHONE (OUTPATIENT)
Dept: PULMONOLOGY | Age: 38
End: 2018-02-26

## 2018-02-26 ENCOUNTER — OFFICE VISIT (OUTPATIENT)
Dept: INFECTIOUS DISEASES | Age: 38
End: 2018-02-26
Payer: COMMERCIAL

## 2018-02-26 ENCOUNTER — TELEPHONE (OUTPATIENT)
Dept: INFECTIOUS DISEASES | Age: 38
End: 2018-02-26

## 2018-02-26 VITALS
OXYGEN SATURATION: 98 % | BODY MASS INDEX: 22.18 KG/M2 | RESPIRATION RATE: 14 BRPM | WEIGHT: 110 LBS | SYSTOLIC BLOOD PRESSURE: 90 MMHG | HEIGHT: 59 IN | DIASTOLIC BLOOD PRESSURE: 64 MMHG | TEMPERATURE: 98.1 F | HEART RATE: 83 BPM

## 2018-02-26 DIAGNOSIS — M86.9 OSTEOMYELITIS OF TOE OF LEFT FOOT (HCC): ICD-10-CM

## 2018-02-26 DIAGNOSIS — I96 GANGRENE OF TOE OF LEFT FOOT (HCC): Primary | ICD-10-CM

## 2018-02-26 PROCEDURE — G8427 DOCREV CUR MEDS BY ELIG CLIN: HCPCS | Performed by: INTERNAL MEDICINE

## 2018-02-26 PROCEDURE — G8420 CALC BMI NORM PARAMETERS: HCPCS | Performed by: INTERNAL MEDICINE

## 2018-02-26 PROCEDURE — G8484 FLU IMMUNIZE NO ADMIN: HCPCS | Performed by: INTERNAL MEDICINE

## 2018-02-26 PROCEDURE — 1111F DSCHRG MED/CURRENT MED MERGE: CPT | Performed by: INTERNAL MEDICINE

## 2018-02-26 PROCEDURE — 99214 OFFICE O/P EST MOD 30 MIN: CPT | Performed by: INTERNAL MEDICINE

## 2018-02-26 PROCEDURE — 1036F TOBACCO NON-USER: CPT | Performed by: INTERNAL MEDICINE

## 2018-02-26 RX ORDER — LEVOFLOXACIN 500 MG/1
500 TABLET, FILM COATED ORAL DAILY
Qty: 7 TABLET | Refills: 0 | Status: SHIPPED | OUTPATIENT
Start: 2018-02-26 | End: 2018-02-26 | Stop reason: SDUPTHER

## 2018-02-26 ASSESSMENT — ENCOUNTER SYMPTOMS
ALLERGIC/IMMUNOLOGIC NEGATIVE: 1
COLOR CHANGE: 1
RESPIRATORY NEGATIVE: 1
EYES NEGATIVE: 1
GASTROINTESTINAL NEGATIVE: 1

## 2018-02-26 NOTE — PROGRESS NOTES
[facility] - Initial Consult Note  Today's Date and Time: 2/26/2018, 11:15 PM    Impression :   · Left toe tip osteomyelitis  · Left toe tip necrotic ulcer with gangrenous nail bed  · Rash in the hospital, unclear etiology, less likely antibiotics-resolved    Recommendations   · The 4 of Levaquin, tolerated well. Continue another 2 weeks until seen by me, watch for joint pain that might necessitate stopping the medication. · Follow-up with vascular this week. She might need amputation of the tip of that toe versus possible debridement. · Keep cleaning the open wound/ulcer with Betadine twice a day,  ·   1. Gangrene of toe of left foot (HCC)  DISCONTINUED: levofloxacin (LEVAQUIN) 500 MG tablet   2. Osteomyelitis of toe of left foot (HCC)  DISCONTINUED: levofloxacin (LEVAQUIN) 500 MG tablet       Return in about 2 weeks (around 3/12/2018). History of Present Illness:   Janice Vee is a 30y.o.-year-old  female who presents with   Chief Complaint   Patient presents with    Osteomyelitis      toe infection      56-year-old lady was in a smoker, had issues with her left total knee and had a surgery to that hallux toenail complicated with gangrene of the toenail bed. Admitted to the hospital and she was worked up, found to have peripheral vascular disease in the left lower extremity, status post a Bypass by Dr. David Banegas. She tolerated the procedure well, the hallux. The bed gangrene was dry and maintained with Betadine, recently started on Levaquin today day 4 or 5, for possible superinfection, noticed to have a scab on the ronald Over the weekend, called me and I suggested she comes to the ER, she instead stayed home. She kept doing the Betadine and comes in today for a follow-up.   The toe itself does not have any signs of active inflammation, the toenail bed that has been necrotic continues to have quite a few necrotic edges, but the middle has unroofed and some slough is the crater of what is now mainly bed. The need is felt forming at some point in the bed. No toe discharge or purulence noticed, and no related redness or inflammation over the normal soft tissues around it. She sees her vascular surgeon this end of week. I have personally reviewed the past medical history, past surgical history, medications, social history, and family history, and I have updated the database accordingly. Past Medical History:     Past Medical History:   Diagnosis Date    Chronic back pain     Psoriasis     Scoliosis        Past Surgical  History:     Past Surgical History:   Procedure Laterality Date     SECTION      MD VEIN BYPASS GRAFT,FEM-POP Left 2018    LEFT LEG SUPER FEMORAL POSTERIOR TIBIAL BYPASS, REVERSE SAPHENEOUS VEIN GRAFT , HARVEST RIGHT, COMPLETION ANGIOGRAM,  INFUSION NITROGLYCERIN APPLICATION OF SOFT CAST LEFT LEG performed by Nuvia Padgett MD at [facility]  2012       Medications:     Current Outpatient Prescriptions:     betamethasone dipropionate (DIPROLENE) 0.05 % cream, Apply topically 2 times daily Apply topically 2 times daily. , Disp: , Rfl:     HYDROcodone-acetaminophen (NORCO) 5-325 MG per tablet, Take 1 tablet by mouth every 6 hours as needed for Pain., Disp: , Rfl:     levofloxacin (LEVAQUIN) 500 MG tablet, Take 1 tablet by mouth daily for 14 days, Disp: 14 tablet, Rfl: 0    aspirin 81 MG EC tablet, Take 1 tablet by mouth daily, Disp: 30 tablet, Rfl: 3    gabapentin (NEURONTIN) 300 MG capsule, Take 600 mg by mouth 3 times daily . , Disp: , Rfl:     clobetasol (TEMOVATE) 0.05 % ointment, Apply topically 2 times daily. , Disp: 100 g, Rfl: 2    sertraline (ZOLOFT) 25 MG tablet, Take 1 tablet by mouth daily. , Disp: 30 tablet, Rfl: 6      Social History:     Social History     Social History    Marital status: Single     Spouse name: N/A    Number of children: N/A    Years of education: N/A     Occupational History    Not on file. Social History Main Topics    Smoking status: Former Smoker     Packs/day: 1.00     Years: 15.00    Smokeless tobacco: Never Used    Alcohol use No    Drug use: Yes    Sexual activity: Yes     Partners: Male     Other Topics Concern    Not on file     Social History Narrative    No narrative on file       Family History:     Family History   Problem Relation Age of Onset    Cancer Mother     Diabetes Maternal Grandmother     Stroke Maternal Grandmother         Allergies:   Penicillins     Review of Systems:   Review of Systems   Constitutional: Negative. HENT: Negative. Eyes: Negative. Respiratory: Negative. Cardiovascular: Negative. Gastrointestinal: Negative. Endocrine: Negative. Genitourinary: Negative. Musculoskeletal: Negative. Skin: Positive for color change and wound. Allergic/Immunologic: Negative. Neurological: Negative. Hematological: Negative. Psychiatric/Behavioral: Negative. Physical Examination :   Blood pressure 90/64, pulse 83, temperature 98.1 °F (36.7 °C), resp. rate 14, height 4' 11\" (1.499 m), weight 110 lb (49.9 kg), SpO2 98 %. Physical Exam   Constitutional: She is oriented to person, place, and time. She appears well-developed and well-nourished. No distress. HENT:   Head: Normocephalic and atraumatic. Mouth/Throat: No oropharyngeal exudate. Eyes: Conjunctivae and EOM are normal. No scleral icterus. Neck: Neck supple. No thyromegaly present. Cardiovascular: Normal rate and normal heart sounds. No murmur heard. Pulmonary/Chest: Effort normal and breath sounds normal. No respiratory distress. She has no wheezes. Abdominal: Bowel sounds are normal. She exhibits no distension. There is no tenderness. Musculoskeletal: Normal range of motion. She exhibits no edema. Lymphadenopathy:     She has no cervical adenopathy. Neurological: She is oriented to person, place, and time.  No cranial nerve

## 2018-03-01 ENCOUNTER — OFFICE VISIT (OUTPATIENT)
Dept: VASCULAR SURGERY | Age: 38
End: 2018-03-01

## 2018-03-01 VITALS
HEIGHT: 59 IN | BODY MASS INDEX: 22.18 KG/M2 | OXYGEN SATURATION: 98 % | RESPIRATION RATE: 18 BRPM | SYSTOLIC BLOOD PRESSURE: 104 MMHG | HEART RATE: 85 BPM | DIASTOLIC BLOOD PRESSURE: 60 MMHG | WEIGHT: 110.01 LBS

## 2018-03-01 DIAGNOSIS — Z95.828 H/O ARTERIAL BYPASS OF LOWER LIMB: ICD-10-CM

## 2018-03-01 DIAGNOSIS — I73.9 PVD (PERIPHERAL VASCULAR DISEASE) (HCC): Primary | ICD-10-CM

## 2018-03-01 DIAGNOSIS — I96 GANGRENE OF TOE OF LEFT FOOT (HCC): ICD-10-CM

## 2018-03-01 PROCEDURE — 99024 POSTOP FOLLOW-UP VISIT: CPT | Performed by: SURGERY

## 2018-03-01 ASSESSMENT — ENCOUNTER SYMPTOMS
GASTROINTESTINAL NEGATIVE: 1
ALLERGIC/IMMUNOLOGIC NEGATIVE: 1
RESPIRATORY NEGATIVE: 1
EYES NEGATIVE: 1
EYE DISCHARGE: 0

## 2018-03-01 NOTE — PROGRESS NOTES
signed      Social History:     Tobacco:    reports that she has quit smoking. She has a 15.00 pack-year smoking history. She has never used smokeless tobacco.  Alcohol:      reports that she does not drink alcohol. Drug Use:  reports that she uses drugs.       Review of Systems:     Positive and Negative as described in HPI      Constitutional:  negative for  fevers, chills, sweats, fatigue, and weight loss  HEENT:  negative for vision or hearing changes,   Respiratory:  negative for shortness of breath, cough, or congestion  Cardiovascular:  negative for  chest pain, palpitations  Gastrointestinal:  negative for nausea, vomiting, diarrhea, constipation, abdominal pain  Genitourinary:  negative for frequency, dysuria  Integument:  negative for rash, skin lesions  Chest/Breast:  No painful inspiration or expiration, no rib sternal pain  Musculoskeletal:  negative for muscle aches or joint pain  Neurological:  negative for headaches, dizziness, lightheadedness, numbness, pain and tingling extremities  Lymphatics: no lymphadenopathy or painful masses  Behavior/Psych:  + for depression , -anxiety    Physical Exam:     Vitals:  /60 (Site: Left Arm, Position: Sitting, Cuff Size: Medium Adult)   Pulse 85   Resp 18   Ht 4' 11.02\" (1.499 m)   Wt 110 lb 0.2 oz (49.9 kg)   SpO2 98%   BMI 22.21 kg/m²       General appearance - alert, well appearing and in no acute distress  Mental status - oriented to person, place and time with normal affect  Head - normocephalic and atraumatic  Eyes - pupils equal and reactive, extraocular eye movements intact, conjunctiva clear  Neck - supple, no carotid bruits, thyroid not palpable, no JVD  Chest - clear to auscultation, normal effort  Heart - normal rate, regular rhythm, no murmurs  Abdomen - soft, non-tender, non-distended, bowel sounds present all four quadrants, no masses  Neurological - normal speech, no focal findings or movement disorder noted, cranial nerves II through

## 2018-03-22 ENCOUNTER — TELEPHONE (OUTPATIENT)
Dept: VASCULAR SURGERY | Age: 38
End: 2018-03-22

## 2018-04-10 ENCOUNTER — TELEPHONE (OUTPATIENT)
Dept: VASCULAR SURGERY | Age: 38
End: 2018-04-10

## 2018-04-23 ENCOUNTER — TELEPHONE (OUTPATIENT)
Dept: VASCULAR SURGERY | Age: 38
End: 2018-04-23

## 2018-04-24 ENCOUNTER — TELEPHONE (OUTPATIENT)
Dept: VASCULAR SURGERY | Age: 38
End: 2018-04-24

## 2018-05-15 ENCOUNTER — TELEPHONE (OUTPATIENT)
Dept: VASCULAR SURGERY | Age: 38
End: 2018-05-15

## 2018-06-25 ENCOUNTER — TELEPHONE (OUTPATIENT)
Dept: VASCULAR SURGERY | Age: 38
End: 2018-06-25

## 2023-12-30 NOTE — PROGRESS NOTES
[facility] - Initial Consult Note  Today's Date and Time: 2/4/2018, 1:51 PM  admission date 1/29/2018  Impression :   Current:    · Osteomyelitis and gangrene of left great toe - Xray shows the osteo of the hallux  · psorias of the skin - off therapy  · Rash left foot - psoriatic,   · Possible left foot dorsal cellulitis  · Small arterial disease, smoker, possible Buerger's disease     Other:  ·    Discussion    · Pt with history of foot trauma presents to the hospital with necrosis of left great toe  · Vascular workup poor small vessel supply;    · X-ray of left foot showing suspected  osteomyelitis / gangrene of the distal phalanx of left great toe   · Left foot red mostly from the psoriasis  · Per vascular eliquis  And Plavix- no surgical intervention, May need MRI if concern for worsening osteomyelitis. · coagulation work up- negative   · vancomycin trough low, however, patient has been switch to zyvox  Recommendations   · On vanco for now -adjust dose for trough 14-17- targetting the OM of the halux -   · on zyvox and cipro - add flagyl -   · OR in am for revascularization of the left leg - second opinion Dr Deepak Weston  · smoking counselling - wants to stop  · Rash likely from pain meds or from the bleach -rash was present before the po meds, and is progressing since   · Disc x w pt and mom     Infection Control Recommendations   · [place] Precautions  Antimicrobial Stewardship Recommendations   · Simplification of therapy  Chief complaint/reason for consultation:   Left foot pain; possible osteomyeltis; not feeling well      History of Present Illness:   Initial history:  Tasha Chaudhari is a 30y.o.-year-old  female who was initially admitted on 1/29/18. She presented to the hospital complaining of pain and swelling in her left big toe.  Patient reports that she is a patient of Dr. Dinh Ryder who sent her to the emergency room for worsening pain and swelling in her left foot. Patient reports that she had a toenail infection approximately 6 months ago and had her toenail removed by Dr. Ethelyn Burkitt. She states that her left big toe turned black after having the toenail removed. She was seen in [facility] about four days ago and was given prescriptions for clindamycin and ciprofloxacin. She reports that she has severe pain in her left foot and is unable to sleep because of the pain. No feveer or chills and feels the redness over the foot is worse last 2 days. Active smoker and no DM, no prior digit necrosis.     We are consulted for management of osteomyelitis.      Interval changes 02/04/18   No fevers overnight  Patient still continues to admit to pain in toe  Patient seems amenable to discharge, however patient's mother does not  No recent labs to review  No adverse reactions to Zyvox  Has skinrash on the back and the abd since 2/2/18 before the po antibiotics   Patient taken Morphine, percocet and Toradol for pain relief  Vanc trough yesterday was 5.6. Patient on Zyvox already    Summary of relevant labs:  Labs:   WBC 9.6-9.5  Hb- 12.5   Cr-0.35   UA negative  CRP  40.9   Urine drug screen positive for opioids and barbiturates   Micro:     Imaging:  Echo  Left ventricle is normal in size Global left ventricular systolic function  is normal Estimated ejection fraction is 55 % . Left atrium is normal in size. Normal aortic valve structure. Trivial mitral regurgitation. Trivial tricuspid regurgitation. No pericardial effusion seen. VL vein mapping lower B/L    No evidence of superficial or deep venous thrombosis in both lower    extremities. VL TCPO2 multiple sites    Abnormal value at Electrode(s) C, D and E which may impede spontaneous    wound healing.             X-ray suggesting cellulitis and osteomyelitis of the distak phalanx of left great toe.    Doppler venous:  No evidence of superficial or deep venous thrombosis in the right common   femoral vein or in the left lower extremity. Xray left foot:  Suspect cellulitis and osteomyelitis of the distal phalanx left great toe. Consider further evaluation with MRI  Art dopplers:  Right:    Pressure differential suggest aortoiliac disease.    Toe brachial index suggest mild to moderate vascular insufficiency at    rest.        Left:    Near flat line toe pressures suggesting significant distal vascular    disease. Although ankle-brachial index suggest only mild vascular    insufficiency interpret the study with caution.             I have personally reviewed the past medical history, past surgical history, medications, social history, and family history, and I have updated the database accordingly. Past Medical History:     Past Medical History:   Diagnosis Date    Chronic back pain     Psoriasis     Scoliosis        Past Surgical  History:     Past Surgical History:   Procedure Laterality Date     SECTION         Medications:      linezolid  600 mg Oral 2 times per day    ciprofloxacin  500 mg Oral 2 times per day    ketorolac  30 mg Intravenous Q6H    clopidogrel  75 mg Oral Daily    apixaban  10 mg Oral BID    NIFEdipine  30 mg Oral Daily    clobetasol   Topical BID    gabapentin  300 mg Oral TID    sertraline  25 mg Oral Nightly    sodium chloride flush  10 mL Intravenous 2 times per day       Social History:     Social History     Social History    Marital status: Single     Spouse name: N/A    Number of children: N/A    Years of education: N/A     Occupational History    Not on file.      Social History Main Topics    Smoking status: Current Every Day Smoker     Packs/day: 1.00     Years: 15.00    Smokeless tobacco: Never Used    Alcohol use No    Drug use: Yes     Types: Marijuana    Sexual activity: Yes     Partners: Male     Other Topics Concern    Not on file     Social History Narrative    No narrative on file       Family History:     Family History   Problem Relation Age of sounds normal. No stridor. She has no wheezes. She exhibits no tenderness. Abdominal: Soft. Bowel sounds are normal. She exhibits no distension. There is no tenderness. There is no rebound. Musculoskeletal: Normal range of motion. She exhibits no edema. Left hallux gangrene, dry  Left foot erythematous but improving   Neurological: She is alert and oriented to person, place, and time. Skin: Skin is warm and dry. Rash noted. She is not diaphoretic. There is erythema. Erythematus and dry gangrenous toe   Psychiatric: Affect and judgment normal.         Medical Decision Making:   I have independently reviewed/ordered the following labs:    CBC with Differential:   Recent Labs      02/03/18   0641   WBC  9.5   HGB  12.5   HCT  37.7   PLT  359   LYMPHOPCT  25   MONOPCT  9     BMP:   Recent Labs      02/03/18   0641   NA  137   K  4.1   CL  99   CO2  26   BUN  12   CREATININE  0.35*     Hepatic Function Panel:   Recent Labs      02/01/18   1811   PROT  7.3   LABALBU  3.8   BILIDIR  <0.08   IBILI  CANNOT BE CALCULATED   BILITOT  <0.10*   ALKPHOS  76   ALT  17   AST  18     No results for input(s): RPR in the last 72 hours. No results for input(s): HIV in the last 72 hours. No results for input(s): BC in the last 72 hours. Lab Results   Component Value Date    MUCUS NOT REPORTED 02/01/2018    RBC 4.00 02/03/2018    TRICHOMONAS NOT REPORTED 02/01/2018    WBC 9.5 02/03/2018    YEAST NOT REPORTED 02/01/2018    TURBIDITY CLEAR 02/01/2018     Lab Results   Component Value Date    CREATININE 0.35 02/03/2018    GLUCOSE 94 02/03/2018       Detailed results: Thank you for allowing us to participate in the care of this patient. Please call with questions. Titi Murphy MD  Office: [phone]         I have discussed the care of the patient, including pertinent history and exam findings,  with the resident.  I have seen and examined the patient and the key elements of all parts of the encounter have Patient has psych appt 1/8 and therapy appt 1/2

## 2025-01-09 NOTE — PROGRESS NOTES
The patient was signed out to me by Delfina Araya PA-C at the end of her shift for follow-up on final disposition, likely transfer to [facility].  The sign-out included relevant details of the history, physical, and work-up to date. The chart has been reviewed, new test results have been noted, and the patient has been re-evaluated.      Labs  Results for orders placed or performed during the hospital encounter of 01/09/25   Comprehensive Metabolic Panel    Specimen: Blood, Venous   Result Value    Fasting Status     Sodium 134 (L)    Potassium 4.2    Chloride 100    Carbon Dioxide 29    Anion Gap 9    Glucose 219 (H)    BUN 22 (H)    Creatinine 0.81    Glomerular Filtration Rate >90     Comment: eGFR results = or >60 mL/min/1.73m2 = Normal kidney function. Estimated GFR calculated using the CKD-EPI-R (2021) equation that does not include race in the creatinine calculation.    BUN/Cr 27 (H)    Calcium 9.2    Bilirubin, Total 0.5    GOT/AST 27    GPT/ALT 29    Alkaline Phosphatase 95    Albumin 3.7    Protein, Total 8.2    Globulin 4.5 (H)    A/G Ratio 0.8 (L)   C Reactive Protein    Specimen: Blood, Venous   Result Value    C-Reactive Protein <5.0   Sedimentation Rate    Specimen: Blood, Venous   Result Value    RBC Sedimentation Rate 49 (H)   CBC with Automated Differential (performable only)    Specimen: Blood, Venous   Result Value    WBC 8.3    RBC 5.10    HGB 15.1    HCT 46.3    MCV 90.8    MCH 29.6    MCHC 32.6    RDW-CV 12.9    RDW-SD 43.0        NRBC 0    Neutrophil, Percent 71    Lymphocytes, Percent 18    Mono, Percent 7    Eosinophils, Percent 2    Basophils, Percent 1    Immature Granulocytes 1    Absolute Neutrophils 5.9    Absolute Lymphocytes 1.5    Absolute Monocytes 0.6    Absolute Eosinophils  0.2    Absolute Basophils 0.1    Absolute Immature Granulocytes 0.1   ISTAT8 VENOUS  POINT OF CARE    Specimen: Blood   Result Value    BUN - POINT OF CARE 26 (H)    SODIUM - POINT OF  CARE 135    POTASSIUM - POINT OF CARE 4.5    CHLORIDE - POINT OF CARE 96 (L)    TCO2 - POINT OF CARE 30 (H)    ANION GAP - POINT OF CARE 15    HEMATOCRIT - POINT OF CARE 48.0    HEMOGLOBIN - POINT OF CARE 16.3    GLUCOSE - POINT OF CARE 220 (H)    CALCIUM, IONIZED - POINT OF CARE 1.08 (L)    Creatinine 1.00    Glomerular Filtration Rate 86     Comment: eGFR results = or >60 mL/min/1.73m2 = Normal kidney function. Estimated GFR calculated using the CKD-EPI-R (2021) equation that does not include race in the creatinine calculation.   LACTIC ACID VENOUS WITH REFLEX - POINT OF CARE    Specimen: Blood, Venous   Result Value    LACTIC ACID, VENOUS - POINT OF CARE 1.7       Radiology  XR FOOT 3 OR MORE VIEWS RIGHT   Final Result   IMPRESSION:      Cortical irregularity with associated periosteal reaction along the first   metatarsal and second ray resection sites. Findings raise concern for   osteomyelitis. MRI could be considered for further evaluation.               I, Attending Radiologist Reynaldo Schreiber MD, have reviewed the images and   report and concur with these findings interpreted by Resident Radiologist,   Yevgeniy Miller MD.      Electronically Signed by: Reynaldo Schreiber MD   Signed on: 1/9/2025 6:13 AM   Created on Workstation ID: [id number]   Signed on Workstation ID: [id number]          Medications  Medications   vancomycin 1,500 mg in sodium chloride 0.9% 250 mL IVPB (1,500 mg Intravenous New Bag 1/9/25 0728)   cefepime (MAXIPIME) 1,000 mg in sodium chloride 0.9 % 100 mL IVPB (0 mg Intravenous Completed 1/9/25 0726)   metroNIDAZOLE (FLAGYL) premix IVPB 500 mg (0 mg Intravenous Completed 1/9/25 0726)       6:36 AM  Patient awaiting transfer to [facility] for intra-system transfer of care.    7:42 AM  [facility] who is aware of the patient's clinical course and accepts the patient for further care.  Will arrange for intra system transfer through [facility] ambulance over to  left great toe distal bone  5. 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 Psoriasis    Plan:     1. Continue medical mgmt and supportive care  2. Smoking cessation counseling to prevent accelation of disease. 3. Diet: General  4. Analgesia: PCA and percocet - consult to pain mgmt  5. Activity:OOB ok to walk on foot as tolerated  6. Wound care:  Continue w/ bilat compression wraps. BID dressing changes w/ betadine to L great toe. 7. Anticoagulation:  ASA, hep gtt. Will transition to PO AC.   8. ID following:  Feels rash secondary to allergic rxn not related to Abx. 9. Abx:   zyvox, cipro, flagyl - Held at this time secondary to significant rash. Will re-eval and restart if needed.    10. Saline lock IVF  11. PT/OT  Electronically signed by Kalbe Begum DO on 2/7/2018 at 7:19 AM other facility.    8:13 AM  Patient has no other concerns.  Ambulance transfer was placed.      Clinical Impression:  The encounter diagnosis was Abscess of right foot.    Patient to be admitted to Rohit Ortiz PA-C  01/09/25 0813

## (undated) DEVICE — SUTURE VCRL + SZ 3-0 L27IN ABSRB UD L26MM SH 1/2 CIR VCP416H

## (undated) DEVICE — PAD,ABDOMINAL,5"X9",ST,LF,25/BX: Brand: MEDLINE INDUSTRIES, INC.

## (undated) DEVICE — SUTURE NONABSORBABLE MONOFILAMENT 7-0 BV-1 1X24 IN PROLENE 8702H

## (undated) DEVICE — GAUZE,SPONGE,FLUFF,6"X6.75",STRL,5/TRAY: Brand: MEDLINE

## (undated) DEVICE — SUTURE MCRYL + SZ 4 0 L18IN ABSRB UD PC 3 L16MM 3 8 CIR PRIM MCP845G

## (undated) DEVICE — SHEET, ORTHO, SPLIT, STERILE: Brand: MEDLINE

## (undated) DEVICE — SVMMC VASC MAJ PK

## (undated) DEVICE — GLOVE SURG SZ 8 L12IN FNGR THK87MIL WHT LTX FREE

## (undated) DEVICE — DRAPE C ARM UNIV W41XL74IN CLR PLAS XR VELC CLSR POLY STRP

## (undated) DEVICE — LUER-LOK 360°: Brand: CONNECTA, LUER-LOK

## (undated) DEVICE — SUTURE ETHLN SZ 3-0 L18IN NONABSORBABLE BLK L24MM PS-1 3/8 1663G

## (undated) DEVICE — GLOVE SURG SZ 7 L12IN FNGR THK87MIL WHT LTX FREE

## (undated) DEVICE — DRESSING TRNSPAR W5XL4.5IN FLM SHT SEMIPERMEABLE WIND

## (undated) DEVICE — GLOVE SURG SZ 85 L12IN FNGR THK87MIL WHT LTX FREE

## (undated) DEVICE — TRAY CATHETER 16FR F INCLUDE BARDX IC COMPLT CARE URIN M STATLOK

## (undated) DEVICE — BLADE ES ELASTOMERIC COAT INSUL DURABLE BEND UPTO 90DEG

## (undated) DEVICE — Device

## (undated) DEVICE — COVER US PRB W15XL120CM W/ GEL RUBBERBAND TAPE STRP FLD GEN

## (undated) DEVICE — COVER,LIGHT HANDLE,FLX,2/PK: Brand: MEDLINE INDUSTRIES, INC.

## (undated) DEVICE — COVER,MAYO STAND,STERILE: Brand: MEDLINE

## (undated) DEVICE — BANDAGE,GAUZE,BULKEE II,4.5"X4.1YD,STRL: Brand: MEDLINE

## (undated) DEVICE — 48" PROBE COVER W/GEL, ULTRASOUND, STERILE: Brand: SITE-RITE

## (undated) DEVICE — MICROPUNCTURE INTRODUCER SET SILHOUETTE TRANSITIONLESS PUSH-PLUS DESIGN - STIFFENED CANNULA WITH NITINOL WIRE GUIDE: Brand: MICROPUNCTURE

## (undated) DEVICE — GOWN,AURORA,NONREINFORCED,LARGE: Brand: MEDLINE

## (undated) DEVICE — SUTURE VCRL + SZ 2-0 L27IN ABSRB WHT SH 1/2 CIR TAPERCUT VCP417H

## (undated) DEVICE — C-ARM: Brand: UNBRANDED

## (undated) DEVICE — 3M™ IOBAN™ 2 ANTIMICROBIAL INCISE DRAPE 6650EZ: Brand: IOBAN™ 2

## (undated) DEVICE — CONTAINER,SPECIMEN,4OZ,OR STRL: Brand: MEDLINE

## (undated) DEVICE — SUTURE VCRL + SZ 4-0 L18IN ABSRB UD L19MM PS-2 3/8 CIR PRIM VCP496H

## (undated) DEVICE — Z DISCONTINUED BY MEDLINE USE 2711682 TRAY SKIN PREP DRY W/ PREM GLV

## (undated) DEVICE — LOOP,VESSEL,MINI,BLUE,2/PK,STERILE: Brand: MEDLINE

## (undated) DEVICE — GLOVE SURG SZ 75 L12IN FNGR THK87MIL DK GRN LTX FREE ISOLEX

## (undated) DEVICE — DRAIN SURG 24FR L5/16IN DIA8MM SIL RND HUBLESS FULL FLUT

## (undated) DEVICE — PAD GEN USE BORDERED ADH 14IN 2IN AND 12IN 4IN GZ UNIV ST

## (undated) DEVICE — DISCONTINUED USE 405792 GLOVE SURG SENSICARE ALOE LT LF PF ST GRN SZ 7

## (undated) DEVICE — Device: Brand: CLEANCUT ROTATING AORTIC PUNCH, 2.8 MM

## (undated) DEVICE — SUTURE VCRL SZ 3-0 L27IN ABSRB UD L26MM SH 1/2 CIR J416H

## (undated) DEVICE — SKIN AFFIX SURG ADHESIVE 72/CS 0.55ML: Brand: MEDLINE

## (undated) DEVICE — CATHETER ETER IV 18GA L125IN POLYUR STR RADPQ INTROCAN SFTY

## (undated) DEVICE — SUTURE PERMAHAND SZ 4-0 L18IN NONABSORBABLE BLK SILK BRAID A183H

## (undated) DEVICE — GOWN,AURORA,NONRNF,XL,30/CS: Brand: MEDLINE

## (undated) DEVICE — PRESSURE MONITORING LINES 4FT. (122CM) M/F: Brand: PRESSURE MONITORING LINES

## (undated) DEVICE — PUNCH AORT DIA4MM LNG HNDL

## (undated) DEVICE — DRAPE,REIN 53X77,STERILE: Brand: MEDLINE

## (undated) DEVICE — SUTURE PROL SZ 5-0 L36IN NONABSORBABLE BLU L13MM C-1 3/8 8720H

## (undated) DEVICE — TAPE UMB 72X7/32 IN

## (undated) DEVICE — GAUZE,SPONGE,4"X4",16PLY,XRAY,STRL,LF: Brand: MEDLINE

## (undated) DEVICE — CONTAINER SPEC 33OZ URIN COLL BIODEGRADABLE PAPER DISP

## (undated) DEVICE — GLOVE SURG SZ 75 L12IN FNGR THK87MIL WHT LTX FREE

## (undated) DEVICE — SPONGE LAP W18XL18IN WHT COT 4 PLY FLD STRUNG RADPQ DISP ST

## (undated) DEVICE — SUTURE PROL SZ 6-0 L24IN NONABSORBABLE BLU L9.3MM BV-1 3/8 8805H

## (undated) DEVICE — LARGE (BLUE) FOR GRAFTS UP TO 10 MM, 20 1/2" / 52 CM (1/PKG): Brand: SCANLAN® VASCULAR TUNNELER SHEATHS AND BULLET TIPS

## (undated) DEVICE — SUTURE PERMAHAND SZ 3-0 L18IN NONABSORBABLE BLK SILK BRAID A184H

## (undated) DEVICE — STRIP,CLOSURE,WOUND,MEDI-STRIP,1/2X4: Brand: MEDLINE

## (undated) DEVICE — PADDING CAST W6INXL4YD COT LO LINTING WYTEX

## (undated) DEVICE — SUTURE ETHLN SZ 3-0 L18IN NONABSORBABLE BLK L24MM FS-1 3/8 663G

## (undated) DEVICE — SHEET TRNSF W355XL435IN TBLR ORNG MAXI MINI

## (undated) DEVICE — PAD N ADH W3XL4IN POLY COT SFT PERF FLM EASILY CUT ABSRB

## (undated) DEVICE — 3M™ COBAN™ NL STERILE NON-LATEX SELF-ADHERENT WRAP, 2086S, 6 IN X 5 YD (15 CM X 4,5 M), 12 ROLLS/CASE: Brand: 3M™ COBAN™

## (undated) DEVICE — Z INACTIVE USE 2535480 CLIP LIG M BLU TI HRT SHP WIRE HORZ 180 PER BX

## (undated) DEVICE — GLOVE SURG SZ 8 CRM LTX FREE POLYISOPRENE POLYMER BEAD ANTI

## (undated) DEVICE — Z DISCONTINUED USE 2275676 GLOVE SURG SZ 65 L12IN FNGR THK87MIL DK GRN LTX FREE ISOLEX

## (undated) DEVICE — BANDAGE,ELASTIC,ESMARK,STERILE,4"X9',LF: Brand: MEDLINE

## (undated) DEVICE — TOWEL,OR,DSP,ST,BLUE,DLX,XR,4/PK,20PK/CS: Brand: MEDLINE